# Patient Record
Sex: MALE | Race: WHITE | Employment: OTHER | URBAN - METROPOLITAN AREA
[De-identification: names, ages, dates, MRNs, and addresses within clinical notes are randomized per-mention and may not be internally consistent; named-entity substitution may affect disease eponyms.]

---

## 2019-01-14 ENCOUNTER — HOSPITAL ENCOUNTER (EMERGENCY)
Age: 29
Discharge: HOME | End: 2019-01-14

## 2019-01-14 VITALS
DIASTOLIC BLOOD PRESSURE: 79 MMHG | HEIGHT: 74 IN | SYSTOLIC BLOOD PRESSURE: 133 MMHG | WEIGHT: 300 LBS | OXYGEN SATURATION: 98 % | HEART RATE: 63 BPM | BODY MASS INDEX: 38.5 KG/M2 | TEMPERATURE: 97.9 F | RESPIRATION RATE: 12 BRPM

## 2019-01-14 DIAGNOSIS — G56.01 CARPAL TUNNEL SYNDROME OF RIGHT WRIST: Primary | ICD-10-CM

## 2019-01-14 PROCEDURE — 6370000000 HC RX 637 (ALT 250 FOR IP): Performed by: NURSE PRACTITIONER

## 2019-01-14 PROCEDURE — 99283 EMERGENCY DEPT VISIT LOW MDM: CPT

## 2019-01-14 RX ORDER — IBUPROFEN 800 MG/1
800 TABLET ORAL EVERY 6 HOURS PRN
Qty: 20 TABLET | Refills: 0 | Status: SHIPPED | OUTPATIENT
Start: 2019-01-14 | End: 2020-03-06

## 2019-01-14 RX ORDER — IBUPROFEN 800 MG/1
800 TABLET ORAL ONCE
Status: COMPLETED | OUTPATIENT
Start: 2019-01-14 | End: 2019-01-14

## 2019-01-14 RX ORDER — IBUPROFEN 800 MG/1
800 TABLET ORAL EVERY 6 HOURS PRN
COMMUNITY
End: 2019-01-14 | Stop reason: ALTCHOICE

## 2019-01-14 RX ORDER — M-VIT,TX,IRON,MINS/CALC/FOLIC 27MG-0.4MG
1 TABLET ORAL DAILY
COMMUNITY
End: 2022-06-12 | Stop reason: ALTCHOICE

## 2019-01-14 RX ADMIN — IBUPROFEN 800 MG: 800 TABLET ORAL at 13:14

## 2019-01-14 ASSESSMENT — PAIN DESCRIPTION - DESCRIPTORS: DESCRIPTORS: NUMBNESS

## 2019-01-14 ASSESSMENT — PAIN DESCRIPTION - PAIN TYPE: TYPE: ACUTE PAIN

## 2019-01-14 ASSESSMENT — PAIN DESCRIPTION - LOCATION: LOCATION: HAND;WRIST

## 2019-01-14 ASSESSMENT — PAIN SCALES - GENERAL: PAINLEVEL_OUTOF10: 3

## 2019-01-14 ASSESSMENT — PAIN DESCRIPTION - ORIENTATION: ORIENTATION: RIGHT

## 2020-02-25 ENCOUNTER — OFFICE VISIT (OUTPATIENT)
Dept: FAMILY MEDICINE CLINIC | Age: 30
End: 2020-02-25
Payer: COMMERCIAL

## 2020-02-25 VITALS
RESPIRATION RATE: 18 BRPM | HEIGHT: 74 IN | OXYGEN SATURATION: 98 % | HEART RATE: 81 BPM | BODY MASS INDEX: 40.43 KG/M2 | WEIGHT: 315 LBS | TEMPERATURE: 98.8 F | DIASTOLIC BLOOD PRESSURE: 78 MMHG | SYSTOLIC BLOOD PRESSURE: 137 MMHG

## 2020-02-25 PROCEDURE — G8417 CALC BMI ABV UP PARAM F/U: HCPCS | Performed by: NURSE PRACTITIONER

## 2020-02-25 PROCEDURE — G8427 DOCREV CUR MEDS BY ELIG CLIN: HCPCS | Performed by: NURSE PRACTITIONER

## 2020-02-25 PROCEDURE — 99204 OFFICE O/P NEW MOD 45 MIN: CPT | Performed by: NURSE PRACTITIONER

## 2020-02-25 PROCEDURE — G8484 FLU IMMUNIZE NO ADMIN: HCPCS | Performed by: NURSE PRACTITIONER

## 2020-02-25 PROCEDURE — 1036F TOBACCO NON-USER: CPT | Performed by: NURSE PRACTITIONER

## 2020-02-25 RX ORDER — PREDNISONE 10 MG/1
10 TABLET ORAL 2 TIMES DAILY
Qty: 10 TABLET | Refills: 0 | Status: SHIPPED | OUTPATIENT
Start: 2020-02-25 | End: 2020-03-01

## 2020-02-25 ASSESSMENT — ENCOUNTER SYMPTOMS
COUGH: 0
DIARRHEA: 0
WHEEZING: 0
BLOOD IN STOOL: 0
VOMITING: 0
ABDOMINAL PAIN: 0
TROUBLE SWALLOWING: 0
SHORTNESS OF BREATH: 0
COLOR CHANGE: 0
CHEST TIGHTNESS: 0
VOICE CHANGE: 0
NAUSEA: 0
EYE PAIN: 0
ABDOMINAL DISTENTION: 0
PHOTOPHOBIA: 0
CONSTIPATION: 0

## 2020-02-25 ASSESSMENT — PATIENT HEALTH QUESTIONNAIRE - PHQ9
1. LITTLE INTEREST OR PLEASURE IN DOING THINGS: 0
2. FEELING DOWN, DEPRESSED OR HOPELESS: 0
SUM OF ALL RESPONSES TO PHQ QUESTIONS 1-9: 0
SUM OF ALL RESPONSES TO PHQ QUESTIONS 1-9: 0
SUM OF ALL RESPONSES TO PHQ9 QUESTIONS 1 & 2: 0

## 2020-02-25 NOTE — PROGRESS NOTES
Brianna Hare is a 30 y.o. male who presents today for   Chief Complaint   Patient presents with   [place] Established New Doctor    Numbness     tingling right hand/wrist    Knee Pain     right-chronic    Other     family h/o CAD         HPI       Pt presents new to [facility], pt has not had a PCP for many years. Pt denies being treated for any chronic medical conditions in the past. He is not currently on any prescribed medications. Pt is a full time     Numbness/Tingling Right Hand  Pt c/o intermittent numbness/tingling to right hand and wrist for several months, denies any recent/past injuries. Pt stated he does have pain that wakes him up at night. Pt describes a burning -like pain. Works does a lot of repetitive pulling/lifitng etc    Chronic Right Knee Pain  Pt c/o chronic achy-like pain to right knee for > 6 months. Pt also c/o intermittent edema, denies deformities or warmth to affected knee. Denies any recent/past injuries, pt is requesting referral to Ortho, he has not had imaging, will order Xray today, pt does a lot of repetitive kneeling, works full time-      Pt is agreeable to have Preventative Labs ordered today, he has a significant family h/o CAD, pt stated many males within the family have been dx with CAD and MI between age 36 and 48, discussed further evaluation by Cardiology-pt is agreeable, he is currently asymptomatic      [facility]:  Patient's past medical, surgical, social and/or family history reviewed, updated in chart, and are non-contributory (unless otherwise stated). Medications and allergies also reviewed and updated in chart. Review of Systems  Review of Systems   Constitutional: Negative for activity change, appetite change, chills, diaphoresis, fatigue, fever and unexpected weight change.    HENT: Negative for congestion, ear discharge, ear pain, hearing loss, mouth sores, nosebleeds, rhinorrhea, sinus pressure, sinus pain, sneezing, sore toxic-appearing or diaphoretic. HENT:      Head: Normocephalic and atraumatic. Right Ear: Tympanic membrane, ear canal and external ear normal.      Left Ear: Tympanic membrane, ear canal and external ear normal.      Nose: Nose normal. No congestion or rhinorrhea. Mouth/Throat:      Mouth: Mucous membranes are moist.      Pharynx: Oropharynx is clear. No oropharyngeal exudate or posterior oropharyngeal erythema. Eyes:      General:         Right eye: No discharge. Left eye: No discharge. Extraocular Movements: Extraocular movements intact. Conjunctiva/sclera: Conjunctivae normal.      Pupils: Pupils are equal, round, and reactive to light. Neck:      Musculoskeletal: Normal range of motion and neck supple. No neck rigidity or muscular tenderness. Thyroid: No thyromegaly. Cardiovascular:      Rate and Rhythm: Normal rate and regular rhythm. Pulses: Normal pulses. Heart sounds: Normal heart sounds. No murmur. Comments: No peripheral edema  Pulmonary:      Effort: Pulmonary effort is normal. No respiratory distress. Breath sounds: Normal breath sounds. No stridor. No wheezing, rhonchi or rales. Chest:      Chest wall: No tenderness. Abdominal:      General: Bowel sounds are normal. There is no distension. Palpations: Abdomen is soft. There is no mass. Tenderness: There is no abdominal tenderness. There is no rebound. Musculoskeletal:      Comments: Mild/moderate generalized pain to right knee, mild edema present, no deformities, + crepitus w/flexion/extension, normal popliteal pulse, ambulating w/o limp    Mild pain present to right wrist-medial/lateral aspects, decreased strength present w/hand grasp, normal pulse. Capillary refill brisk, + Phalens   Lymphadenopathy:      Cervical: No cervical adenopathy. Skin:     General: Skin is warm and dry. Coloration: Skin is not jaundiced or pale. Findings: No bruising, erythema or rash. Neurological:      Mental Status: He is alert and oriented to person, place, and time. Deep Tendon Reflexes: Reflexes are normal and symmetric. Psychiatric:         Mood and Affect: Mood normal.         Behavior: Behavior normal.         Thought Content: Thought content normal.         Judgment: Judgment normal.           Assessment / Plan:      Mayito Morton was seen today for established new doctor    Diagnoses and all orders for this visit:    Healthcare maintenance  -     CBC Auto Differential; Future  -     Comprehensive Metabolic Panel; Future  -     Lipid Panel; Future  -     TSH without Reflex; Future    Numbness and tingling in right hand  -     Nerve conduction test; Future  -     predniSONE (DELTASONE) 10 MG tablet; Take 1 tablet by mouth 2 times daily for 5 days    Chronic pain of right knee  -     XR KNEE RIGHT (3 VIEWS); Future  -     Yuli Bonner 16, DO, Orthopaedics and Sports Medicine, [facility]    Family history of coronary artery disease younger than 36years of age  -     CBC Auto Differential; Future  -     Comprehensive Metabolic Panel; Future  -     Lipid Panel; Future  -     TSH without Reflex; Future  -     Janet Durán MD, Cardiology, [facility]         Call or go to ED immediately if symptoms worsen or persist.    Return for f/u will be based on labs. , or sooner if necessary. Educational materials and/or home exercises printed for patient's review and were included in patient instructions on his/her After Visit Summary and given to patient at the end of visit. Counseled regarding above diagnosis, including possible risks and complications,  especially if left uncontrolled. Counseled regarding the possible side effects, risks, benefits and alternatives to treatment; patient and/or guardian verbalizes understanding, agrees, feels comfortable with and wishes to proceed with above treatment plan.     Advised patient to call with any new medication issues, and

## 2020-02-27 ENCOUNTER — TELEPHONE (OUTPATIENT)
Dept: FAMILY MEDICINE CLINIC | Age: 30
End: 2020-02-27

## 2020-02-29 ENCOUNTER — HOSPITAL ENCOUNTER (OUTPATIENT)
Dept: GENERAL RADIOLOGY | Age: 30
Discharge: HOME | End: 2020-03-02
Payer: COMMERCIAL

## 2020-02-29 ENCOUNTER — HOSPITAL ENCOUNTER (OUTPATIENT)
Age: 30
Discharge: HOME | End: 2020-03-02
Payer: COMMERCIAL

## 2020-02-29 ENCOUNTER — HOSPITAL ENCOUNTER (OUTPATIENT)
Age: 30
Discharge: HOME | End: 2020-02-29
Payer: COMMERCIAL

## 2020-02-29 LAB
ALBUMIN SERPL-MCNC: 4.8 G/DL (ref 3.5–5.2)
ALP BLD-CCNC: 77 U/L (ref 40–129)
ALT SERPL-CCNC: 51 U/L (ref 0–40)
ANION GAP SERPL CALCULATED.3IONS-SCNC: 11 MMOL/L (ref 7–16)
AST SERPL-CCNC: 31 U/L (ref 0–39)
BASOPHILS ABSOLUTE: 0.02 E9/L (ref 0–0.2)
BASOPHILS RELATIVE PERCENT: 0.3 % (ref 0–2)
BILIRUB SERPL-MCNC: 0.7 MG/DL (ref 0–1.2)
BUN BLDV-MCNC: 18 MG/DL (ref 6–20)
CALCIUM SERPL-MCNC: 9.9 MG/DL (ref 8.6–10.2)
CHLORIDE BLD-SCNC: 101 MMOL/L (ref 98–107)
CHOLESTEROL, TOTAL: 206 MG/DL (ref 0–199)
CO2: 26 MMOL/L (ref 22–29)
CREAT SERPL-MCNC: 1 MG/DL (ref 0.7–1.2)
EOSINOPHILS ABSOLUTE: 0.1 E9/L (ref 0.05–0.5)
EOSINOPHILS RELATIVE PERCENT: 1.4 % (ref 0–6)
GFR AFRICAN AMERICAN: >60
GFR NON-AFRICAN AMERICAN: >60 ML/MIN/1.73
GLUCOSE BLD-MCNC: 102 MG/DL (ref 74–99)
HCT VFR BLD CALC: 48.6 % (ref 37–54)
HDLC SERPL-MCNC: 34 MG/DL
HEMOGLOBIN: 16.3 G/DL (ref 12.5–16.5)
IMMATURE GRANULOCYTES #: 0.03 E9/L
IMMATURE GRANULOCYTES %: 0.4 % (ref 0–5)
LDL CHOLESTEROL CALCULATED: 144 MG/DL (ref 0–99)
LYMPHOCYTES ABSOLUTE: 1.88 E9/L (ref 1.5–4)
LYMPHOCYTES RELATIVE PERCENT: 27.2 % (ref 20–42)
MCH RBC QN AUTO: 29.6 PG (ref 26–35)
MCHC RBC AUTO-ENTMCNC: 33.5 % (ref 32–34.5)
MCV RBC AUTO: 88.4 FL (ref 80–99.9)
MONOCYTES ABSOLUTE: 0.5 E9/L (ref 0.1–0.95)
MONOCYTES RELATIVE PERCENT: 7.2 % (ref 2–12)
NEUTROPHILS ABSOLUTE: 4.39 E9/L (ref 1.8–7.3)
NEUTROPHILS RELATIVE PERCENT: 63.5 % (ref 43–80)
PDW BLD-RTO: 12.1 FL (ref 11.5–15)
PLATELET # BLD: 187 E9/L (ref 130–450)
PMV BLD AUTO: 10.5 FL (ref 7–12)
POTASSIUM SERPL-SCNC: 4.4 MMOL/L (ref 3.5–5)
RBC # BLD: 5.5 E12/L (ref 3.8–5.8)
SODIUM BLD-SCNC: 138 MMOL/L (ref 132–146)
TOTAL PROTEIN: 7.4 G/DL (ref 6.4–8.3)
TRIGL SERPL-MCNC: 142 MG/DL (ref 0–149)
TSH SERPL DL<=0.05 MIU/L-ACNC: 0.27 UIU/ML (ref 0.27–4.2)
VLDLC SERPL CALC-MCNC: 28 MG/DL
WBC # BLD: 6.9 E9/L (ref 4.5–11.5)

## 2020-02-29 PROCEDURE — 80053 COMPREHEN METABOLIC PANEL: CPT

## 2020-02-29 PROCEDURE — 36415 COLL VENOUS BLD VENIPUNCTURE: CPT

## 2020-02-29 PROCEDURE — 73562 X-RAY EXAM OF KNEE 3: CPT

## 2020-02-29 PROCEDURE — 80061 LIPID PANEL: CPT

## 2020-02-29 PROCEDURE — 84443 ASSAY THYROID STIM HORMONE: CPT

## 2020-02-29 PROCEDURE — 85025 COMPLETE CBC W/AUTO DIFF WBC: CPT

## 2020-03-01 ASSESSMENT — ENCOUNTER SYMPTOMS
SINUS PRESSURE: 0
RECTAL PAIN: 0
CHOKING: 0
EYE DISCHARGE: 0
SORE THROAT: 0
ANAL BLEEDING: 0
STRIDOR: 0
RHINORRHEA: 0
EYE REDNESS: 0
EYE ITCHING: 0
SINUS PAIN: 0
APNEA: 0

## 2020-03-02 ENCOUNTER — HOSPITAL ENCOUNTER (OUTPATIENT)
Age: 30
Discharge: HOME | End: 2020-03-04
Payer: COMMERCIAL

## 2020-03-02 ENCOUNTER — HOSPITAL ENCOUNTER (OUTPATIENT)
Dept: GENERAL RADIOLOGY | Age: 30
Discharge: HOME | End: 2020-03-04
Payer: COMMERCIAL

## 2020-03-02 PROCEDURE — 73560 X-RAY EXAM OF KNEE 1 OR 2: CPT

## 2020-03-06 ENCOUNTER — OFFICE VISIT (OUTPATIENT)
Dept: ORTHOPEDIC SURGERY | Age: 30
End: 2020-03-06
Payer: COMMERCIAL

## 2020-03-06 ENCOUNTER — TELEPHONE (OUTPATIENT)
Dept: FAMILY MEDICINE CLINIC | Age: 30
End: 2020-03-06

## 2020-03-06 VITALS — BODY MASS INDEX: 39.17 KG/M2 | WEIGHT: 315 LBS | HEIGHT: 75 IN

## 2020-03-06 PROCEDURE — G8427 DOCREV CUR MEDS BY ELIG CLIN: HCPCS | Performed by: ORTHOPAEDIC SURGERY

## 2020-03-06 PROCEDURE — 99203 OFFICE O/P NEW LOW 30 MIN: CPT | Performed by: ORTHOPAEDIC SURGERY

## 2020-03-06 PROCEDURE — 1036F TOBACCO NON-USER: CPT | Performed by: ORTHOPAEDIC SURGERY

## 2020-03-06 PROCEDURE — G8484 FLU IMMUNIZE NO ADMIN: HCPCS | Performed by: ORTHOPAEDIC SURGERY

## 2020-03-06 PROCEDURE — G8417 CALC BMI ABV UP PARAM F/U: HCPCS | Performed by: ORTHOPAEDIC SURGERY

## 2020-03-06 RX ORDER — PREDNISONE 1 MG/1
5 TABLET ORAL DAILY
COMMUNITY
End: 2020-06-03

## 2020-03-06 NOTE — PROGRESS NOTES
meetings of clubs or organizations: Not on file     Relationship status: Not on file    Intimate partner violence:     Fear of current or ex partner: Not on file     Emotionally abused: Not on file     Physically abused: Not on file     Forced sexual activity: Not on file   Other Topics Concern    Not on file   Social History Narrative    Not on file     No family history on file. ROS:    Skin: (-) rash,(-) psoriasis,(-) eczema, (-)skin cancer. Musculoskeletal: (-) fractures,  (-) dislocations,(-) collagen vascular disease, (-) fibromyalgia, (-) multiple sclerosis, (-) muscular dystrophy, (-) RSD,(-) joint pain (-)swelling, (-) joint pain,swelling. Neurologic: (-) epilepsy, (-)seizures,(-) brain tumor,(-) TIA, (-)stroke, (-)headaches, (-)Parkinson disease,(-) memory loss, (-) LOC. Cardiovascular: (-) Chest pain, (-) swelling in legs/feet, (-) SOB, (-) cramping in legs/feet with walking. Physical Exam:    Ht 6' 3\" (1.905 m)   Wt (!) 330 lb (149.7 kg)   BMI 41.25 kg/m²     GENERAL: alert, appears stated age, cooperative, no distress    HEENT: Head is normocephalic, atraumatic. PERRLA. SKIN: Clean, dry, intact. There is not any cellulitis or cutaneous lesions noted in the lower extremities except noted below in MSK    PULMONARY: breathing is regular and unlabored, no acute distress    CV: The bilateral lower extremities are warm and well-perfused with brisk capillary refill. 2+ pulses LE bilateral.     PSYCHIATRY: Pleasant mood, appropriate behavior, follows commands    NEURO: Sensation is intact distally with light touch with no alteration. Motor exam of the lower extremities show quadriceps, hamstrings, foot dorsiflexion and plantarflexion grossly intact 5/5. LYMPH: No lymphedema present distally in LE.      Ortho Exam       Right Knee  Alignment:  neutral   ROM:  0 degrees extension to 110 degrees flexion      Crepitus:  no   Joint Tenderness:  medial joint line   Effusion:   moderate Patellar tilt test:  negative   Patellar facet tenderness:  negative medial   negative lateral   Trochlear tenderness:  negative   Moving patellar apprehension test:  negative   Hoffa's test:  negative medial   negative lateral      Hyperextension test:  negative medial   negative lateral   Lachman test:  negative      Anterior drawer test:  negative   Pivot shift test:  negative   Posterior drawer:   negative   Varus laxity at 30 degrees:  negative      Valgus laxity at 30 degrees:   negative      Jamey's test:  positive       Quadriceps atrophy:  None     Left Knee  Alignment:  neutral   ROM:  0 degrees extension to 140 degrees flexion      Crepitus:  no   Joint Tenderness:  none    Effusion:   none        Patellar tilt test:  negative   Patellar facet tenderness:  negative medial   negative lateral   Trochlear tenderness:  negative   Moving patellar apprehension test:  negative   Hoffa's test:  negative medial   negative lateral      Hyperextension test:  negative medial   negative lateral   Lachman test:  negative      Anterior drawer test:  negative   Pivot shift test:  negative   Posterior drawer:   negative   Varus laxity at 30 degrees:  negative      Valgus laxity at 30 degrees:   negative      Jamey's test:  negative       Quadriceps atrophy:  None         Imaging:  Xr Knee Right (1-2 Views)    Result Date: 3/2/2020  Patient MRN:  [id number] : 1990 Age: 30 years Gender: Male Order Date:  3/2/2020 1:20 PM EXAM: XR KNEE RIGHT (1-2 VIEWS) NUMBER OF IMAGES:  3 views INDICATION: R93.6 Abnormal x-ray of knee RADIOLOGIST RECOMMEND SUNRISE VIEW FOR PROBABLE RIGHT PATELLA FRACTURE abnormal, probable patella fracture- recommend SUNRISE VIEW COMPARISON: 2020 . The bones appear to be in anatomic alignment. No acute fracture is identified. No foreign body is identified. No other osseous abnormality is seen. No acute osseous abnormality is identified.  This study was dictated by Soniya Reed Francisco Holguin and Kleber Vivas MD reviewed and concurred with the findings. Xr Knee Right (3 Views)    Result Date: 2020  Patient MRN:  [id number] : 1990 Age: 30 years Gender: Male Order Date:  2020 10:09 AM EXAM: XR KNEE RIGHT (3 VIEWS) NUMBER OF IMAGES:  4 INDICATION: M25.561 Chronic pain of right knee knee pain COMPARISON: None RESULT: There is probably a small fracture at the lateral patellar margin versus normal variant. Otherwise, no fracture of the distal femur or proximal tibia or fibula. Joint spaces are maintained. No knee effusion.  ---------------------------------------------     Probable small fracture at the lateral patellar margin versus normal variant. A sunrise view of the knee could help further explain this finding. Otherwise, no acute process. Mu Angulo was seen today for knee pain. Diagnoses and all orders for this visit:    Tear of medial meniscus of right knee, current, unspecified tear type, initial encounter  -     MRI Knee Right WO Contrast; Future        Patient seen and examined. X-rays reviewed. Patient sustained a noncontact twisting injury to the right knee. Patient's main complaint is instability of symptomatic knee. Exam and history is consistent with possible meniscus tear and other possible internal derangement such as ACL tear, patellar dislocation, loose body. MRI recommended for further evaluation management. Follow-up after MRI.          Helga Hopkins DO  3/6/20

## 2020-03-07 RX ORDER — ATORVASTATIN CALCIUM 20 MG/1
20 TABLET, FILM COATED ORAL DAILY
Qty: 90 TABLET | Refills: 1 | Status: SHIPPED
Start: 2020-03-07 | End: 2020-07-30

## 2020-03-11 ENCOUNTER — HOSPITAL ENCOUNTER (OUTPATIENT)
Dept: NEUROLOGY | Age: 30
Discharge: HOME | End: 2020-03-11
Payer: COMMERCIAL

## 2020-03-11 VITALS — WEIGHT: 315 LBS | HEIGHT: 75 IN | BODY MASS INDEX: 39.17 KG/M2

## 2020-03-11 PROCEDURE — 95910 NRV CNDJ TEST 7-8 STUDIES: CPT | Performed by: PSYCHIATRY & NEUROLOGY

## 2020-03-11 PROCEDURE — 95886 MUSC TEST DONE W/N TEST COMP: CPT

## 2020-03-11 PROCEDURE — 95886 MUSC TEST DONE W/N TEST COMP: CPT | Performed by: PSYCHIATRY & NEUROLOGY

## 2020-03-11 PROCEDURE — 95910 NRV CNDJ TEST 7-8 STUDIES: CPT

## 2020-03-11 NOTE — PROCEDURES
Flexor pollicis longus N None None None None N N N N   R. Flexor digitorum profundus, dig 2 & 3 N None None None None N N N N   R. Extensor indicis proprius N None None None None N N N N   R. Abductor pollicis brevis N None None None None N 1+ Few Sl Decr   R. First dorsal interosseous N None None None None N N N N       Nerve conduction studies in both arms disclosed the following abnormalities--- motor prolongation of the distal latency of the right median at the wrist, with minimal prolongation of the distal motor latency of the left medial to the wrist.  The motor amplitudes of the right median nerve were decreased. Sensory nerve potentials were not obtained from the right median nerve, with minimal delay in his distal sensory latency of the left median nerve. F-wave latencies of both median nerves were prolonged. These findings were compared to the referential values in this laboratory, available upon request.    Monopolar needle examination of the right arm revealed minimal chronic denervation changes and conduction block in the abductor pollicis brevis muscle. Needle testing of the paraspinals was unrevealing. Electrodiagnostic semination of both arms disclosed evidence diagnostic of the following--- bilateral median neuropathies at/or distal to the wrists---the right of moderate severity and the left of minimal severity. These findings were consistent with carpal tunnel syndrome. There were no other peripheral neuropathies. There were no motor radiculopathies or intracanalicular lesions. Sensory radiculopathies cannot be evaluated by electrodiagnostic means. Clinically, the patient presented with marked tingling sensations in both hands, more so the right one. He would awaken with these paresthesias in his right hand. On brief neurological examination, he presented with a marked Tinel sign at the right wrist.  I found no motor or sensory compromise.   His difficulties are related to his right carpal tunnel syndrome. Right carpal tunnel release was recommended. Clinical correlation was highly advised.

## 2020-05-29 ENCOUNTER — TELEPHONE (OUTPATIENT)
Dept: ORTHOPEDIC SURGERY | Age: 30
End: 2020-05-29

## 2020-06-02 ENCOUNTER — OFFICE VISIT (OUTPATIENT)
Dept: ORTHOPEDIC SURGERY | Age: 30
End: 2020-06-02
Payer: COMMERCIAL

## 2020-06-02 VITALS — WEIGHT: 300 LBS | BODY MASS INDEX: 38.5 KG/M2 | TEMPERATURE: 98.2 F | HEIGHT: 74 IN | RESPIRATION RATE: 22 BRPM

## 2020-06-02 PROCEDURE — 99214 OFFICE O/P EST MOD 30 MIN: CPT | Performed by: ORTHOPAEDIC SURGERY

## 2020-06-02 PROCEDURE — G8427 DOCREV CUR MEDS BY ELIG CLIN: HCPCS | Performed by: ORTHOPAEDIC SURGERY

## 2020-06-02 PROCEDURE — 1036F TOBACCO NON-USER: CPT | Performed by: ORTHOPAEDIC SURGERY

## 2020-06-02 PROCEDURE — G8417 CALC BMI ABV UP PARAM F/U: HCPCS | Performed by: ORTHOPAEDIC SURGERY

## 2020-06-02 NOTE — PROGRESS NOTES
Department of Orthopedic Surgery  History and Physical      CHIEF COMPLAINT:  Right hand numbness and tingling    HISTORY OF PRESENT ILLNESS:                The patient is a LHD 30 y.o. male who presents with right hand numbness and tingling. Patient has had right hand numbness and tingling for the last 9 months. He states this is progressively worsened and has become constant every day and every night symptoms of numbness, tingling and pain. He reports diminished sensation to his right thumb, index and middle finger. He states he wakes up at night with numbness and tingling and pain. He has tried a brace at night which does not help his symptoms. He works as a . He states his left hand is not bothersome to him. Patient did have an EMG nerve conduction study test dated March 11 of 2020. Right median nerve motor latency was 6.25 and left was 4.58. Right median nerve sensory latency was non-recordable and left was 4.32. On the EMG portion of the exam he did have 1+ dur and fwe PPP and decrease in recruitment pattern to the right APB. This is consistent with right moderate carpal tunnel syndrome and left mild carpal tunnel syndrome. Past Medical History:        Diagnosis Date    Hypertension      Past Surgical History:        Procedure Laterality Date    FRACTURE SURGERY      right wrist, left knee, right leg    KNEE SURGERY       Current Medications:   No current facility-administered medications for this visit. Allergies:  Patient has no known allergies. Social History:   TOBACCO:   reports that he has never smoked. He has never used smokeless tobacco.  ETOH:   reports no history of alcohol use. DRUGS:   reports current drug use. Drug: Marijuana. ACTIVITIES OF DAILY LIVING:    OCCUPATION:    Family History:   No family history on file.     REVIEW OF SYSTEMS:  CONSTITUTIONAL:  negative  EYES:  negative  HEENT:  negative  RESPIRATORY:  negative  CARDIOVASCULAR:  htn  GASTROINTESTINAL: 02/29/2020    MCV 88.4 02/29/2020    MCH 29.6 02/29/2020    MCHC 33.5 02/29/2020    RDW 12.1 02/29/2020     02/29/2020    MPV 10.5 02/29/2020     PT/INR:  No results found for: PROTIME, INR    Radiology Review: X-rays of the right wrist were obtained today in the office and reviewed with patient. 4 views: AP, lateral, oblique, carpal tunnel view demonstrate no acute fractures or dislocations. Impression: No acute fractures or dislocations    IMPRESSION:  · Right carpal tunnel syndrome    PLAN:  Discussed findings with patient. Discussed conservative and surgical management with the patient. Patient would like to proceed with surgical management. We will plan for a right endoscopic carpal tunnel release. Postoperative course explained to the patient. Patient like to proceed. All questions answered. I explained the risks, benefits, alternatives and complications of surgery with the patient including but not limited to the risks of infection, possible damage to nerves, vessels, or tendons, stiffness, loss of range of motion, scar sensitivity, wound healing complications, worsening symptoms, possible need for therapy, as well as the possible need further surgery and unanticipated complications. The patient voiced understanding and all questions were answered. The patient elected to proceed with surgical intervention. I have seen and evaluated the patient and agree with the above assessment and plan on today's visit. I have performed the key components of the history and physical examination with significant findings of severe right carpal tunnel syndrome with significant numbness and constant symptoms. . I concur with the findings and plan as documented.     Jorge Goodwin MD  6/2/2020

## 2020-06-02 NOTE — PATIENT INSTRUCTIONS
just the thumb and index finger can put stress on the wrist.  · Do not smoke. It can make this condition worse by reducing blood flow to the median nerve. If you need help quitting, talk to your doctor about stop-smoking programs and medicines. These can increase your chances of quitting for good. When should you call for help? Watch closely for changes in your health, and be sure to contact your doctor if:  · Your pain or other problems do not get better with home care. · You want more information about physical or occupational therapy. · You have side effects of your corticosteroid medicine, such as:  ? Weight gain. ? Mood changes. ? Trouble sleeping. ? Bruising easily. · You have any other problems with your medicine. Where can you learn more? Go to [email or web address] and sign in to your [employer] account. Enter R432 in the [employer] box to learn more about \"Carpal Tunnel Syndrome: Care Instructions. \"     If you do not have an account, please click on the \"Sign Up Now\" link. Current as of: March 2, 2020               Content Version: 12.5  © 9678-9488 [employer]. Care instructions adapted under license by [facility] ([facility]). If you have questions about a medical condition or this instruction, always ask your healthcare professional. [address] any warranty or liability for your use of this information. Patient Education        Carpal Tunnel Release: Before Your Surgery  What is carpal tunnel release? Carpal tunnel surgery reduces the pressure on a nerve in the wrist. Your doctor will cut a ligament that presses on the nerve. This lets the nerve pass freely through the tunnel without being squeezed. This is also called carpal tunnel release surgery. The surgery can be open or endoscopic. In open surgery, your doctor makes a small cut in the palm of your hand. This cut is called an incision.  In endoscopic surgery, your doctor always ask your healthcare professional. [place] [address] any warranty or liability for your use of this information. Patient Education        Carpal Tunnel Syndrome: Exercises  Introduction  Here are some examples of exercises for you to try. The exercises may be suggested for a condition or for rehabilitation. Start each exercise slowly. Ease off the exercises if you start to have pain. You will be told when to start these exercises and which ones will work best for you. Warm-up stretches  When you no longer have pain or numbness, you can do exercises to help prevent carpal tunnel syndrome from coming back. Do not do any stretch or movement that is uncomfortable or painful. 1. Rotate your wrist up, down, and from side to side. Repeat 4 times. 2. Stretch your fingers far apart. Relax them, and then stretch them again. Repeat 4 times. 3. Stretch your thumb by pulling it back gently, holding it, and then releasing it. Repeat 4 times. How to do the exercises  Prayer stretch   1. Start with your palms together in front of your chest just below your chin. 2. Slowly lower your hands toward your waistline, keeping your hands close to your stomach and your palms together until you feel a mild to moderate stretch under your forearms. 3. Hold for at least 15 to 30 seconds. Repeat 2 to 4 times. Wrist flexor stretch   1. Extend your arm in front of you with your palm up. 2. Bend your wrist, pointing your hand toward the floor. 3. With your other hand, gently bend your wrist farther until you feel a mild to moderate stretch in your forearm. 4. Hold for at least 15 to 30 seconds. Repeat 2 to 4 times. Wrist extensor stretch   1. Repeat steps 1 through 4 of the stretch above, but begin with your extended hand palm down. Follow-up care is a key part of your treatment and safety. Be sure to make and go to all appointments, and call your doctor if you are having problems.  It's also a good

## 2020-06-03 ENCOUNTER — OFFICE VISIT (OUTPATIENT)
Dept: ORTHOPEDIC SURGERY | Age: 30
End: 2020-06-03
Payer: COMMERCIAL

## 2020-06-03 VITALS — WEIGHT: 300 LBS | BODY MASS INDEX: 38.5 KG/M2 | HEIGHT: 74 IN

## 2020-06-03 PROCEDURE — 1036F TOBACCO NON-USER: CPT | Performed by: ORTHOPAEDIC SURGERY

## 2020-06-03 PROCEDURE — 99213 OFFICE O/P EST LOW 20 MIN: CPT | Performed by: ORTHOPAEDIC SURGERY

## 2020-06-03 PROCEDURE — G8417 CALC BMI ABV UP PARAM F/U: HCPCS | Performed by: ORTHOPAEDIC SURGERY

## 2020-06-03 PROCEDURE — G8428 CUR MEDS NOT DOCUMENT: HCPCS | Performed by: ORTHOPAEDIC SURGERY

## 2020-06-03 NOTE — PROGRESS NOTES
Joint Tenderness:  medial joint line   Effusion:   moderate        Patellar tilt test:  negative   Patellar facet tenderness:  negative medial   negative lateral   Trochlear tenderness:  negative   Moving patellar apprehension test:  negative   Hoffa's test:  negative medial   negative lateral      Hyperextension test:  negative medial   negative lateral   Lachman test:  negative      Anterior drawer test:  negative   Pivot shift test:  negative   Posterior drawer:   negative   Varus laxity at 30 degrees:  negative      Valgus laxity at 30 degrees:   negative      Jamey's test:  positive       Quadriceps atrophy:  None     Left Knee  Alignment:  neutral   ROM:  0 degrees extension to 140 degrees flexion      Crepitus:  no   Joint Tenderness:  none    Effusion:   none        Patellar tilt test:  negative   Patellar facet tenderness:  negative medial   negative lateral   Trochlear tenderness:  negative   Moving patellar apprehension test:  negative   Hoffa's test:  negative medial   negative lateral      Hyperextension test:  negative medial   negative lateral   Lachman test:  negative      Anterior drawer test:  negative   Pivot shift test:  negative   Posterior drawer:   negative   Varus laxity at 30 degrees:  negative      Valgus laxity at 30 degrees:   negative      Jamey's test:  negative       Quadriceps atrophy:  None         Imaging:  Xr Knee Right (1-2 Views)    Result Date: 3/2/2020  Patient MRN:  [id number] : 1990 Age: 30 years Gender: Male Order Date:  3/2/2020 1:20 PM EXAM: XR KNEE RIGHT (1-2 VIEWS) NUMBER OF IMAGES:  3 views INDICATION: R93.6 Abnormal x-ray of knee RADIOLOGIST RECOMMEND SUNRISE VIEW FOR PROBABLE RIGHT PATELLA FRACTURE abnormal, probable patella fracture- recommend SUNRISE VIEW COMPARISON: 2020 . The bones appear to be in anatomic alignment. No acute fracture is identified. No foreign body is identified. No other osseous abnormality is seen.      No acute osseous abnormality is identified. This study was dictated by Zenon Hagen PA-C and Temi Hewitt MD reviewed and concurred with the findings. Xr Knee Right (3 Views)    Result Date: 2020  Patient MRN:  [id number] : 1990 Age: 30 years Gender: Male Order Date:  2020 10:09 AM EXAM: XR KNEE RIGHT (3 VIEWS) NUMBER OF IMAGES:  4 INDICATION: M25.561 Chronic pain of right knee knee pain COMPARISON: None RESULT: There is probably a small fracture at the lateral patellar margin versus normal variant. Otherwise, no fracture of the distal femur or proximal tibia or fibula. Joint spaces are maintained. No knee effusion.  ---------------------------------------------     Probable small fracture at the lateral patellar margin versus normal variant. A sunrise view of the knee could help further explain this finding. Otherwise, no acute process. MRI right knee 3/20/2020     Indication: Knee pain, evaluate for medial meniscus tear.       Comparison: Right knee radiograph from 3/2/2020.       Technique: Multiplanar, multisequence MR imaging of the right knee was   performed without administration of intravenous contrast.       FINDINGS:       The anterior cruciate ligament and posterior cruciate ligament are   intact. The medial collateral ligament is intact. The iliotibial band,   fibular collateral ligament, and biceps femoris tendon are intact. The   popliteus muscle and tendon are intact.       The medial meniscus and posterior root attachment are intact. There is   slight cartilage heterogeneity along the medial aspect of the medial   tibial plateau.       The lateral meniscus and posterior root attachment are intact. The   lateral compartment cartilage is intact.       There is cartilage surface irregularity along the patellar apex. There   are focal areas of cartilage loss with underlying subchondral reactive   change along the medial trochlea.       The quadriceps and patellar tendons are intact.  There is patella felisha. There is slight lateral patellar tilt. The tibial tuberosity to   trochlear groove distance is approximately 24 mm. The pes anserinus   and semimembranosus tendons are intact.       There is a small joint effusion. There is no Baker's cyst. The fat   pads are preserved in signal.       There is no evidence of fracture or osteonecrosis.         Impression   1. Patella felisha with slight lateral patellar tilt and increased tibial   tuberosity to trochlear groove distance, correlate for patellar   instability. 2. Cartilage surface irregularity along the patellar apex with focal   areas of grade 4 chondrosis along the medial trochlea. 3. Menisci are intact. Lizzie Rowe was seen today for knee pain. Diagnoses and all orders for this visit:    Disorder of patellofemoral joint, unspecified laterality    Other orders  -     Amb External Referral To Physical Therapy        Patient seen and examined. X-rays reviewed. Patient sustained a noncontact twisting injury to the right knee. Patient's main complaint is instability of symptomatic knee. Exam and history is consistent with possible meniscus tear and other possible internal derangement such as ACL tear, patellar dislocation, loose body. MRI recommended for further evaluation management. Follow-up after MRI. Patient seen and examined. X-rays reviewed. Natural history and course discussed with patient. Treatment options discussed with patient in detail including risks and benefits. Patient should do well with conservative management at this time. In a 15 minute assessment and discussion, patient was counseled on continued weight loss, diet, and physical activity relating to this condition.  He was educated with options in detail including nutrition, joining a health club/ weight loss program, and use of cardio equipment such as the Arc Trainer and the importance of use as well as range of motion and HEP exercises for weight [place].       Craig Cano,             25 minutes was spent with patient. 50% or greater was spent counseling the patient.

## 2020-07-09 ENCOUNTER — PREP FOR PROCEDURE (OUTPATIENT)
Dept: ORTHOPEDIC SURGERY | Age: 30
End: 2020-07-09

## 2020-07-09 RX ORDER — SODIUM CHLORIDE 0.9 % (FLUSH) 0.9 %
10 SYRINGE (ML) INJECTION PRN
Status: CANCELLED | OUTPATIENT
Start: 2020-07-09

## 2020-07-09 RX ORDER — SODIUM CHLORIDE 9 MG/ML
INJECTION, SOLUTION INTRAVENOUS CONTINUOUS
Status: CANCELLED | OUTPATIENT
Start: 2020-07-09

## 2020-07-09 RX ORDER — SODIUM CHLORIDE 0.9 % (FLUSH) 0.9 %
10 SYRINGE (ML) INJECTION EVERY 12 HOURS SCHEDULED
Status: CANCELLED | OUTPATIENT
Start: 2020-07-09

## 2020-07-30 ENCOUNTER — OFFICE VISIT (OUTPATIENT)
Dept: CARDIOLOGY CLINIC | Age: 30
End: 2020-07-30
Payer: COMMERCIAL

## 2020-07-30 VITALS
WEIGHT: 315 LBS | SYSTOLIC BLOOD PRESSURE: 122 MMHG | DIASTOLIC BLOOD PRESSURE: 60 MMHG | HEART RATE: 61 BPM | BODY MASS INDEX: 39.17 KG/M2 | HEIGHT: 75 IN

## 2020-07-30 PROBLEM — E78.00 PURE HYPERCHOLESTEROLEMIA: Status: ACTIVE | Noted: 2020-07-30

## 2020-07-30 PROBLEM — E66.01 MORBID OBESITY (HCC): Status: ACTIVE | Noted: 2020-07-30

## 2020-07-30 PROCEDURE — 99243 OFF/OP CNSLTJ NEW/EST LOW 30: CPT | Performed by: INTERNAL MEDICINE

## 2020-07-30 PROCEDURE — G8417 CALC BMI ABV UP PARAM F/U: HCPCS | Performed by: INTERNAL MEDICINE

## 2020-07-30 PROCEDURE — 93000 ELECTROCARDIOGRAM COMPLETE: CPT | Performed by: INTERNAL MEDICINE

## 2020-07-30 PROCEDURE — G8427 DOCREV CUR MEDS BY ELIG CLIN: HCPCS | Performed by: INTERNAL MEDICINE

## 2020-07-30 NOTE — PROGRESS NOTES
Social History:  Social History     Socioeconomic History    Marital status:      Spouse name: Not on file    Number of children: Not on file    Years of education: Not on file    Highest education level: Not on file   Occupational History    Not on file   Social Needs    Financial resource strain: Not on file    Food insecurity     Worry: Not on file     Inability: Not on file    Transportation needs     Medical: Not on file     Non-medical: Not on file   Tobacco Use    Smoking status: Never Smoker    Smokeless tobacco: Never Used   Substance and Sexual Activity    Alcohol use: No     Comment: occasional. 1-2 coffee per day    Drug use: Yes     Types: Marijuana     Comment: recreational    Sexual activity: Not on file   Lifestyle    Physical activity     Days per week: Not on file     Minutes per session: Not on file    Stress: Not on file   Relationships    Social connections     Talks on phone: Not on file     Gets together: Not on file     Attends [employer] service: Not on file     Active member of club or organization: Not on file     Attends meetings of clubs or organizations: Not on file     Relationship status: Not on file    Intimate partner violence     Fear of current or ex partner: Not on file     Emotionally abused: Not on file     Physically abused: Not on file     Forced sexual activity: Not on file   Other Topics Concern    Not on file   Social History Narrative    Not on file       Allergies:  No Known Allergies    Current Medications:  Current Outpatient Medications   Medication Sig Dispense Refill    Multiple Vitamins-Minerals (THERAPEUTIC MULTIVITAMIN-MINERALS) tablet Take 1 tablet by mouth daily       No current facility-administered medications for this visit.           Physical Exam:  /60   Pulse 61   Ht 6' 3\" (1.905 m)   Wt (!) 330 lb (149.7 kg)   BMI 41.25 kg/m²   Wt Readings from Last 3 Encounters:   07/30/20 (!) 330 lb (149.7 kg)   06/03/20 300 lb (136.1 kg)   06/02/20 300 lb (136.1 kg)     The patient is awake, alert and in no discomfort or distress. No gross musculoskeletal deformity or lymphadenopathy are present. No significant skin or nail changes are present. Gross examination of head, eyes, nose and throat are negative. Jugular venous pressure is normal and no carotid bruits are present. No thyromegaly is noted. Normal respiratory effort is noted with no accessory muscle usage present. Lung fields are clear to ascultation. Cardiac examination is notable for a regular rate and rhythm with no palpable thrill. No gallop rhythm or cardiac murmur are identified. A benign abdominal examination is present with the exception of obesity and no masses or organomegaly. A normal abdominal aortic pulsation is present. Intact pulses are present throughout all extremities and no peripheral edema is present. No focal neurologic deficits are present. Laboratory Tests:  Lab Results   Component Value Date    CREATININE 1.0 02/29/2020    BUN 18 02/29/2020     02/29/2020    K 4.4 02/29/2020     02/29/2020    CO2 26 02/29/2020     No results found for: BNP  Lab Results   Component Value Date    WBC 6.9 02/29/2020    RBC 5.50 02/29/2020    HGB 16.3 02/29/2020    HCT 48.6 02/29/2020    MCV 88.4 02/29/2020    MCH 29.6 02/29/2020    MCHC 33.5 02/29/2020    RDW 12.1 02/29/2020     02/29/2020    MPV 10.5 02/29/2020     No results for input(s): ALKPHOS, ALT, AST, PROT, BILITOT, BILIDIR, LABALBU in the last 72 hours.   No results found for: MG  No results found for: PROTIME, INR  Lab Results   Component Value Date    TSH 0.272 02/29/2020     No components found for: CHLPL  Lab Results   Component Value Date    TRIG 142 02/29/2020     Lab Results   Component Value Date    HDL 34 02/29/2020     Lab Results   Component Value Date    LDLCALC 144 (H) 02/29/2020       Cardiac Tests:  ECG: A resting electrocardiogram demonstrates evidence of sinus rhythm and is otherwise unremarkable      ASSESSMENT / PLAN: On a clinical basis, the patient appears stable from a cardiovascular standpoint and presents with no active symptoms of a cardiovascular origin in the face of a family history of premature coronary atherosclerosis with the index event in additional family members occurring in excess of a decade beyond that of his present age. I have extensively discussed this with him and presently have not recommended additional cardiovascular testing and will defer to your discretion if felt appropriate from your standpoint only the consideration of a coronary calcium assessment. I otherwise have extensively discussed his needs of appropriate lifestyle modification to achieve weight reduction to benefit diastolic cardiac performance and to reduce his risk of the development of obstructive sleep apnea with features potentially indicative of its presence warranting a formal sleep assessment and as appropriate the initiation of nocturnal CPAP to reduce risk of adverse cardiovascular effects which I will additionally defer to your discretion. He will continue to benefit from aggressive risk factor monitoring both of his blood pressure and serum lipids and attempt to reduce risk of future atherosclerotic development. I will presently return him to your care with happy to reevaluate him in the future should additional cardiovascular difficulties or concerns arise. Follow-up office visit as needed should additional cardiovascular difficulties or concerns arise. Thank you for allowing me to participate in your patient's care. Please feel free to contact me if you have any questions or concerns. Note: This report was completed utilizing a computerized voice recognition software. Every effort has been made to insure accuracy, however; inadvertent computerized transcription errors may be present. Ambika Astudillo.  Anh Rojas, [address] [place] [address] Cardiology    An electronic copy of this consult note was forwarded to [facility], NP

## 2020-11-20 ENCOUNTER — TELEPHONE (OUTPATIENT)
Dept: FAMILY MEDICINE CLINIC | Age: 30
End: 2020-11-20

## 2020-11-22 NOTE — TELEPHONE ENCOUNTER
Pt was referred to Dr. Harish Salmeron and was seen in June for right knee pain, does he want a new referral to a different Ortho??

## 2020-11-24 NOTE — TELEPHONE ENCOUNTER
Patient called stated Dr Laura Arteaga wanted him to do PT, patient already has done PT , so patient and wife wants a second opinion from another ortho pcp

## 2020-12-27 ENCOUNTER — HOSPITAL ENCOUNTER (EMERGENCY)
Age: 30
Discharge: HOME | End: 2020-12-27
Attending: EMERGENCY MEDICINE
Payer: COMMERCIAL

## 2020-12-27 VITALS
TEMPERATURE: 97.2 F | OXYGEN SATURATION: 98 % | HEART RATE: 65 BPM | DIASTOLIC BLOOD PRESSURE: 94 MMHG | HEIGHT: 75 IN | RESPIRATION RATE: 16 BRPM | SYSTOLIC BLOOD PRESSURE: 166 MMHG | WEIGHT: 315 LBS | BODY MASS INDEX: 39.17 KG/M2

## 2020-12-27 PROCEDURE — 6360000002 HC RX W HCPCS: Performed by: EMERGENCY MEDICINE

## 2020-12-27 PROCEDURE — 6370000000 HC RX 637 (ALT 250 FOR IP): Performed by: EMERGENCY MEDICINE

## 2020-12-27 PROCEDURE — 99283 EMERGENCY DEPT VISIT LOW MDM: CPT

## 2020-12-27 PROCEDURE — 96372 THER/PROPH/DIAG INJ SC/IM: CPT

## 2020-12-27 RX ORDER — KETOROLAC TROMETHAMINE 30 MG/ML
60 INJECTION, SOLUTION INTRAMUSCULAR; INTRAVENOUS ONCE
Status: COMPLETED | OUTPATIENT
Start: 2020-12-27 | End: 2020-12-27

## 2020-12-27 RX ORDER — PENICILLIN V POTASSIUM 500 MG/1
500 TABLET ORAL 4 TIMES DAILY
Qty: 40 TABLET | Refills: 0 | Status: SHIPPED | OUTPATIENT
Start: 2020-12-27 | End: 2021-01-06

## 2020-12-27 RX ORDER — TRAMADOL HYDROCHLORIDE 50 MG/1
50 TABLET ORAL EVERY 6 HOURS PRN
Qty: 12 TABLET | Refills: 0 | Status: SHIPPED | OUTPATIENT
Start: 2020-12-27 | End: 2020-12-30

## 2020-12-27 RX ORDER — NAPROXEN 500 MG/1
500 TABLET ORAL 2 TIMES DAILY PRN
Qty: 60 TABLET | Refills: 0 | Status: SHIPPED | OUTPATIENT
Start: 2020-12-27 | End: 2022-06-12 | Stop reason: ALTCHOICE

## 2020-12-27 RX ORDER — TRAMADOL HYDROCHLORIDE 50 MG/1
50 TABLET ORAL ONCE
Status: COMPLETED | OUTPATIENT
Start: 2020-12-27 | End: 2020-12-27

## 2020-12-27 RX ADMIN — TRAMADOL HYDROCHLORIDE 50 MG: 50 TABLET ORAL at 08:53

## 2020-12-27 RX ADMIN — KETOROLAC TROMETHAMINE 60 MG: 30 INJECTION, SOLUTION INTRAMUSCULAR; INTRAVENOUS at 08:52

## 2020-12-27 ASSESSMENT — PAIN SCALES - GENERAL
PAINLEVEL_OUTOF10: 7
PAINLEVEL_OUTOF10: 10
PAINLEVEL_OUTOF10: 10

## 2020-12-27 ASSESSMENT — PAIN DESCRIPTION - DESCRIPTORS: DESCRIPTORS: DULL

## 2020-12-27 ASSESSMENT — PAIN DESCRIPTION - ORIENTATION: ORIENTATION: RIGHT

## 2020-12-27 ASSESSMENT — PAIN DESCRIPTION - LOCATION: LOCATION: TEETH

## 2020-12-27 ASSESSMENT — PAIN DESCRIPTION - PAIN TYPE: TYPE: ACUTE PAIN

## 2020-12-27 NOTE — ED PROVIDER NOTES
HPI:  12/27/20,   Time: 8:34 AM ANI Whitmore is a 35 y.o. male presenting to the ED for dental pain, beginning 4 days ago. The complaint has been persistent, mild in severity, and worsened by eating and drinking. Pt root canal 6 weeks ago with temp cap, cap fell off, pain to tooth 3 days after fell of, no relief otc meds. No fever, no airway issues/sob. Has appt with dentist in [place] in 2 days    Review of Systems:   Pertinent positives and negatives are stated within HPI, all other systems reviewed and are negative.          --------------------------------------------- PAST HISTORY ---------------------------------------------  Past Medical History:  has a past medical history of Hypertension. Past Surgical History:  has a past surgical history that includes knee surgery and fracture surgery. Social History:  reports that he has never smoked. He has never used smokeless tobacco. He reports current drug use. Drug: Marijuana. He reports that he does not drink alcohol. Family History: family history includes Heart Attack in his maternal grandfather, maternal uncle, maternal uncle, maternal uncle, and maternal uncle; No Known Problems in his father. The patients home medications have been reviewed. Allergies: Patient has no known allergies. ---------------------------------------------------PHYSICAL EXAM--------------------------------------    Constitutional/General: Alert and oriented x3, well appearing, non toxic in NAD  Head: Normocephalic and atraumatic  Eyes: PERRL, EOMI, conjunctive normal, sclera non icteric  Mouth: Oropharynx clear, handling secretions, no trismus, no asymmetry of the posterior oropharynx or uvular edema. Tooth 32 ttp, no cavities noted, no ginigival abscess  Neck: Supple, full ROM, non tender to palpation in the midline, no stridor,   Musculoskeletal: Moves all extremities x 4. Integument: skin warm and dry. No rashes.    Lymphatic: no lymphadenopathy noted  Neurologic: GCS 15, no focal deficits, Psychiatric: Normal Affect    -------------------------------------------------- RESULTS -------------------------------------------------  I have personally reviewed all laboratory and imaging results for this patient. Results are listed below. LABS:  No results found for this visit on 12/27/20. RADIOLOGY:  Interpreted by Radiologist.  No orders to display       EKG: This EKG is signed and interpreted by the EP. Time:   Rate:   Rhythm:   Interpretation:   Comparison: None      ------------------------- NURSING NOTES AND VITALS REVIEWED ---------------------------   The nursing notes within the ED encounter and vital signs as below have been reviewed by myself. BP (!) 166/94   Pulse 65   Temp 97.2 °F (36.2 °C) (Infrared)   Resp 16   Ht 6' 3\" (1.905 m)   Wt (!) 330 lb (149.7 kg)   SpO2 98%   BMI 41.25 kg/m²   Oxygen Saturation Interpretation: nml    The patients available past medical records and past encounters were reviewed. ------------------------------ ED COURSE/MEDICAL DECISION MAKING----------------------  Medications   ketorolac (TORADOL) injection 60 mg (has no administration in time range)   traMADol (ULTRAM) tablet 50 mg (has no administration in time range)         ED COURSE:       Medical Decision Making:    \no fever, no airway compromise, dc on analgesia/abx until dental fu      This patient's ED course included: a personal history and physicial examination    This patient has remained hemodynamically stable during their ED course. Counseling: The emergency provider has spoken with the patient and discussed todays results, in addition to providing specific details for the plan of care and counseling regarding the diagnosis and prognosis.   Questions are answered at this time and they are agreeable with the plan.       --------------------------------- IMPRESSION AND DISPOSITION ---------------------------------    IMPRESSION  1. Pain, dental        DISPOSITION  Disposition: Discharge to home  Patient condition is stable    NOTE: This report was transcribed using voice recognition software.  Every effort was made to ensure accuracy; however, inadvertent computerized transcription errors may be present        Lorraine Eisenmenger, MD  12/27/20 [id number]

## 2021-07-06 ENCOUNTER — OFFICE VISIT (OUTPATIENT)
Dept: FAMILY MEDICINE CLINIC | Age: 31
End: 2021-07-06
Payer: COMMERCIAL

## 2021-07-06 VITALS
DIASTOLIC BLOOD PRESSURE: 77 MMHG | TEMPERATURE: 97.3 F | BODY MASS INDEX: 39.17 KG/M2 | SYSTOLIC BLOOD PRESSURE: 124 MMHG | OXYGEN SATURATION: 100 % | RESPIRATION RATE: 18 BRPM | HEART RATE: 83 BPM | HEIGHT: 75 IN | WEIGHT: 315 LBS

## 2021-07-06 DIAGNOSIS — F51.01 PRIMARY INSOMNIA: Primary | ICD-10-CM

## 2021-07-06 DIAGNOSIS — Z56.6 WORK-RELATED STRESS: ICD-10-CM

## 2021-07-06 PROCEDURE — 99213 OFFICE O/P EST LOW 20 MIN: CPT | Performed by: NURSE PRACTITIONER

## 2021-07-06 PROCEDURE — G8417 CALC BMI ABV UP PARAM F/U: HCPCS | Performed by: NURSE PRACTITIONER

## 2021-07-06 PROCEDURE — 1036F TOBACCO NON-USER: CPT | Performed by: NURSE PRACTITIONER

## 2021-07-06 PROCEDURE — G8427 DOCREV CUR MEDS BY ELIG CLIN: HCPCS | Performed by: NURSE PRACTITIONER

## 2021-07-06 RX ORDER — TEMAZEPAM 15 MG/1
15 CAPSULE ORAL NIGHTLY PRN
Qty: 14 CAPSULE | Refills: 0 | Status: SHIPPED
Start: 2021-07-06 | End: 2021-08-18 | Stop reason: SDUPTHER

## 2021-07-06 SDOH — ECONOMIC STABILITY: FOOD INSECURITY: WITHIN THE PAST 12 MONTHS, THE FOOD YOU BOUGHT JUST DIDN'T LAST AND YOU DIDN'T HAVE MONEY TO GET MORE.: NEVER TRUE

## 2021-07-06 SDOH — HEALTH STABILITY - MENTAL HEALTH: OTHER PHYSICAL AND MENTAL STRAIN RELATED TO WORK: Z56.6

## 2021-07-06 SDOH — ECONOMIC STABILITY: FOOD INSECURITY: WITHIN THE PAST 12 MONTHS, YOU WORRIED THAT YOUR FOOD WOULD RUN OUT BEFORE YOU GOT MONEY TO BUY MORE.: NEVER TRUE

## 2021-07-06 ASSESSMENT — ENCOUNTER SYMPTOMS
EYE PAIN: 0
WHEEZING: 0
RECTAL PAIN: 0
COUGH: 0
TROUBLE SWALLOWING: 0
EYE REDNESS: 0
ABDOMINAL PAIN: 0
SINUS PRESSURE: 0
CHEST TIGHTNESS: 0
COLOR CHANGE: 0
DIARRHEA: 0
BLOOD IN STOOL: 0
RHINORRHEA: 0
ABDOMINAL DISTENTION: 0
VOICE CHANGE: 0
STRIDOR: 0
NAUSEA: 0
CONSTIPATION: 0
PHOTOPHOBIA: 0
ANAL BLEEDING: 0
EYE DISCHARGE: 0
VOMITING: 0
SHORTNESS OF BREATH: 0
SORE THROAT: 0
EYE ITCHING: 0
CHOKING: 0
SINUS PAIN: 0
APNEA: 0

## 2021-07-06 ASSESSMENT — PATIENT HEALTH QUESTIONNAIRE - PHQ9
SUM OF ALL RESPONSES TO PHQ QUESTIONS 1-9: 0
SUM OF ALL RESPONSES TO PHQ QUESTIONS 1-9: 0
1. LITTLE INTEREST OR PLEASURE IN DOING THINGS: 0
SUM OF ALL RESPONSES TO PHQ QUESTIONS 1-9: 0
SUM OF ALL RESPONSES TO PHQ9 QUESTIONS 1 & 2: 0
2. FEELING DOWN, DEPRESSED OR HOPELESS: 0

## 2021-07-06 ASSESSMENT — SOCIAL DETERMINANTS OF HEALTH (SDOH): HOW HARD IS IT FOR YOU TO PAY FOR THE VERY BASICS LIKE FOOD, HOUSING, MEDICAL CARE, AND HEATING?: NOT HARD AT ALL

## 2021-07-06 NOTE — PROGRESS NOTES
Jennifer Landeros is a 35 y.o. male who presents today for   Chief Complaint   Patient presents with    Insomnia    Stress         HPI    Insomnia:  Current treatment: decreasing caffeine consumption, over the counter diphenhydramine and melatonin use, which has been ineffective. Medication side effects: none. Pt stated he is experiencing a lot of work related stress which he thinks may be causing insomnia. Pt stated he is having difficulty falling and staying asleep    [address]:  Patient's past medical, surgical, social and/or family history reviewed, updated in chart, and are non-contributory (unless otherwise stated). Medications and allergies also reviewed and updated in chart. Review of Systems  Review of Systems   Constitutional: Negative for activity change, appetite change, chills, diaphoresis, fatigue, fever and unexpected weight change. HENT: Negative for congestion, ear discharge, ear pain, hearing loss, mouth sores, nosebleeds, rhinorrhea, sinus pressure, sinus pain, sneezing, sore throat, tinnitus, trouble swallowing and voice change. Eyes: Negative for photophobia, pain, discharge, redness, itching and visual disturbance. Respiratory: Negative for apnea, cough, choking, chest tightness, shortness of breath, wheezing and stridor. Cardiovascular: Negative for chest pain, palpitations and leg swelling. Family h/o CAD   Gastrointestinal: Negative for abdominal distention, abdominal pain, anal bleeding, blood in stool, constipation, diarrhea, nausea, rectal pain and vomiting. Skin: Negative for color change, pallor and rash. Neurological: Negative for dizziness, tremors, seizures, syncope, facial asymmetry, speech difficulty, weakness, light-headedness, numbness and headaches. Psychiatric/Behavioral: Positive for sleep disturbance. Negative for decreased concentration, dysphoric mood, self-injury and suicidal ideas. The patient is not nervous/anxious.          C/o stress-work related       Physical Exam:    VS:  /77 (Site: Right Upper Arm, Position: Sitting, Cuff Size: Large Adult)   Pulse 83   Temp 97.3 °F (36.3 °C) (Temporal)   Resp 18   Ht 6' 3\" (1.905 m)   Wt (!) 325 lb 4.8 oz (147.6 kg)   SpO2 100%   BMI 40.66 kg/m²   LAST WEIGHT:  Wt Readings from Last 3 Encounters:   07/06/21 (!) 325 lb 4.8 oz (147.6 kg)   12/27/20 (!) 330 lb (149.7 kg)   07/30/20 (!) 330 lb (149.7 kg)     Physical Exam  Vitals and nursing note reviewed. Constitutional:       General: He is not in acute distress. Appearance: Normal appearance. He is well-developed. He is not ill-appearing, toxic-appearing or diaphoretic. HENT:      Head: Normocephalic and atraumatic. Mouth/Throat:      Mouth: Mucous membranes are moist.      Pharynx: Oropharynx is clear. No oropharyngeal exudate or posterior oropharyngeal erythema. Eyes:      General:         Right eye: No discharge. Left eye: No discharge. Conjunctiva/sclera: Conjunctivae normal.   Neck:      Thyroid: No thyromegaly. Cardiovascular:      Rate and Rhythm: Normal rate and regular rhythm. Pulses: Normal pulses. Heart sounds: Normal heart sounds. No murmur heard. Comments: No peripheral edema  Pulmonary:      Effort: Pulmonary effort is normal. No respiratory distress. Breath sounds: Normal breath sounds. No stridor. No wheezing, rhonchi or rales. Chest:      Chest wall: No tenderness. Musculoskeletal:      Cervical back: Normal range of motion and neck supple. No rigidity or tenderness. No muscular tenderness. Lymphadenopathy:      Cervical: No cervical adenopathy. Skin:     General: Skin is warm and dry. Coloration: Skin is not jaundiced or pale. Findings: No bruising, erythema, lesion or rash. Neurological:      Mental Status: He is alert and oriented to person, place, and time. Motor: No weakness.       Gait: Gait normal.      Deep Tendon Reflexes: Reflexes are normal and symmetric. Psychiatric:         Mood and Affect: Mood normal.         Behavior: Behavior normal.         Thought Content: Thought content normal.         Judgment: Judgment normal.             Assessment / Plan:      Kamran Camp was seen today for insomnia and stress. Diagnoses and all orders for this visit:    Primary insomnia  Will trial Restoril  Advised on side effects  [place] report reviewed  -     temazepam (RESTORIL) 15 MG capsule; Take 1 capsule by mouth nightly as needed for Sleep for up to 14 days. Work-related stress         Controlled Substance Monitoring:    Acute and Chronic Pain Monitoring:   RX Monitoring 7/6/2021   Periodic Controlled Substance Monitoring Possible medication side effects, risk of tolerance/dependence & alternative treatments discussed. ;No signs of potential drug abuse or diversion identified. ;Assessed functional status. Call or go to ED immediately if symptoms worsen or persist.    Return if symptoms worsen or fail to improve. , or sooner if necessary. Educational materials and/or home exercises printed for patient's review and were included in patient instructions on his/her After Visit Summary and given to patient at the end of visit. Counseled regarding above diagnosis, including possible risks and complications,  especially if left uncontrolled. Counseled regarding the possible side effects, risks, benefits and alternatives to treatment; patient and/or guardian verbalizes understanding, agrees, feels comfortable with and wishes to proceed with above treatment plan. Advised patient to call with any new medication issues, and read all Rx info from pharmacy to assure aware of all possible risks and side effects of medication before taking. Reviewed age and gender appropriate health screening exams and vaccinations.   Advised patient regarding importance of keeping up with recommended health maintenance and to schedule as soon as possible if

## 2021-08-18 DIAGNOSIS — F51.01 PRIMARY INSOMNIA: ICD-10-CM

## 2021-08-18 RX ORDER — TEMAZEPAM 15 MG/1
15 CAPSULE ORAL NIGHTLY PRN
Qty: 30 CAPSULE | Refills: 2 | Status: SHIPPED | OUTPATIENT
Start: 2021-08-18 | End: 2021-11-16

## 2022-06-06 ENCOUNTER — OFFICE VISIT (OUTPATIENT)
Dept: ORTHOPEDIC SURGERY | Age: 32
End: 2022-06-06

## 2022-06-06 VITALS — WEIGHT: 315 LBS | BODY MASS INDEX: 39.17 KG/M2 | HEIGHT: 75 IN

## 2022-06-06 DIAGNOSIS — G56.03 BILATERAL CARPAL TUNNEL SYNDROME: Primary | ICD-10-CM

## 2022-06-06 PROCEDURE — 99214 OFFICE O/P EST MOD 30 MIN: CPT | Performed by: ORTHOPAEDIC SURGERY

## 2022-06-06 PROCEDURE — G8417 CALC BMI ABV UP PARAM F/U: HCPCS | Performed by: ORTHOPAEDIC SURGERY

## 2022-06-06 PROCEDURE — 1036F TOBACCO NON-USER: CPT | Performed by: ORTHOPAEDIC SURGERY

## 2022-06-06 PROCEDURE — G8427 DOCREV CUR MEDS BY ELIG CLIN: HCPCS | Performed by: ORTHOPAEDIC SURGERY

## 2022-06-06 NOTE — PATIENT INSTRUCTIONS
Patient Education        Carpal Tunnel Release: Before Your Surgery  What is carpal tunnel release? Carpal tunnel surgery reduces the pressure on a nerve in the wrist. Your doctor will cut a ligament that presses on the nerve. This lets the nerve pass freely through the tunnel without being squeezed. This is also called carpal tunnelrelease surgery. The surgery can be open or endoscopic. In open surgery, your doctor makes a small cut in the palm of your hand. This cut is called an incision. In endoscopic surgery, your doctor makes one small incision in the wrist. Or you may have one small incision in the wrist and one in the palm. Your doctor puts a thin tube with a camera attached (endoscope) into the incision. Surgicaltools are put in along with the endoscope. In both types of surgeries, the incisions are closed with stitches. Theincisions leave scars that usually fade in time. You may be asleep during the surgery. Or you may be awake and have medicine tonumb your hand and arm so you won't feel pain. After surgery, your wrist and hand pain should start to go away. It usually takes 3 to 4 months to recover and 1 year before your hand strength returns. How much hand strength returns is different for each person. You will go home the same day as the surgery. When you can go back to workdepends on the type of work you do. Follow-up care is a key part of your treatment and safety. Be sure to make and go to all appointments, and call your doctor if you are having problems. It's also a good idea to know your test results and keep alist of the medicines you take. How do you prepare for surgery? Surgery can be stressful. This information will help you understand what youcan expect. And it will help you safely prepare for surgery. Preparing for surgery     Be sure you have someone to take you home.  Anesthesia and pain medicine will make it unsafe for you to drive or get home on your own.      Understand changed your mind about having the surgery. Where can you learn more? Go to [email or web address] and sign in to your [employer] account. Enter T049 in the [facility] box to learn more about \"Carpal Tunnel Release: Before Your Surgery. \"     If you do not have an account, please click on the \"Sign Up Now\" link. Current as of: July 1, 2021               Content Version: 13.2  © 2006-2022 Healthwise, [employer]. Care instructions adapted under license by [facility] ([facility]). If you have questions about a medical condition or this instruction, always ask your healthcare professional. [place] [address] any warranty or liability for your use of this information. Patient Education        Carpal Tunnel Release: What to Expect at [facility] tunnel reduces the pressure on a nerve in the wrist. Your doctor cut a ligament that presses on the nerve. This lets the nerve pass freely through thetunnel without being squeezed. Your hand will hurt and may feel weak with some numbness. This usually goes away in a few days, but it may take several months. Your doctor may remove the large bandage, or he or she will tell you when and how to remove it yourself. In some cases, you may have a splint. If you have one, you will wear it forabout 2 weeks. Your doctor will take out your stitches in 1 to 2 weeks. Your hand and wrist may feel worse than they used to feel. But the pain should start to go away. It usually takes 3 to 4 months to recover and up to 1 year before hand strengthreturns. How much strength returns will vary. The timing of your return to work depends on the type of surgery you had, whether the surgery was on your dominant hand (the hand you use most), and yourwork activities. If you had open surgery on your dominant hand and you do repeated actions at work, you may be able to go back to work in 10 to 8 weeks.  Repeated motions include typing or assembly-line work. If the surgery was on the other hand and you don't do repeated actions at work, you may be able to return to work in [facility] 14 days. If you had endoscopic surgery, you may be able to go back to work sooner thanwith open surgery. This care sheet gives you a general idea about how long it will take for you to recover. But each person recovers at a different pace. Follow the steps belowto get better as quickly as possible. How can you care for yourself at home? Activity     Rest when you feel tired. Getting enough sleep will help you recover.      Try to walk each day. Start by walking a little more than you did the day before. Bit by bit, increase the amount you walk.      For up to 2 weeks after surgery, avoid lifting things heavier than 1 to 2 pounds and using your hand. This includes doing repeated arm or hand movements, such as typing or using a computer mouse, washing windows, vacuuming, or chopping food. Do not use power tools, and avoid activities that cause vibration.      You may do heavier tasks about 4 weeks after surgery. These include vacuuming, mowing the lawn, and gardening.      You may shower 24 to 48 hours after surgery, if your doctor okays it. Keep your bandage dry by taping a sheet of plastic to cover it. If you have a splint, keep it dry. Your doctor will tell you if you can remove it when you shower. Be careful not to put the splint on too tight. Do not take a bath until the incision heals, or until your doctor tells you it is okay.      You may drive when you are fully able to use your hand. Diet     You can eat your normal diet. If your stomach is upset, try bland, low-fat foods like plain rice, broiled chicken, toast, and yogurt. Medicines     Your doctor will tell you if and when you can restart your medicines.  He or she will also give you instructions about taking any new medicines.      If you take aspirin or some other blood thinner, ask your doctor if and when to start taking it again. Make sure that you understand exactly what your doctor wants you to do.      Take pain medicines exactly as directed. ? If the doctor gave you a prescription medicine for pain, take it as prescribed. ? If you are not taking a prescription pain medicine, take an over-the-counter medicine such as acetaminophen (Tylenol), ibuprofen (Advil, Motrin), or naproxen (Aleve). Read and follow all instructions on the label. ? Do not take two or more pain medicines at the same time unless the doctor told you to. Many pain medicines have acetaminophen, which is Tylenol. Too much acetaminophen (Tylenol) can be harmful.      If you think your pain medicine is making you sick to your stomach:  ? Take your medicine after meals (unless your doctor has told you not to). ? Ask your doctor for a different pain medicine.      If your doctor prescribed antibiotics, take them as directed. Do not stop taking them just because you feel better. You need to take the full course of antibiotics. Incision and splint care     Keep your bandage dry. If it gets dirty, you may change it.      If you have a splint, talk to your doctor about when you should wear it. Exercise     You may need wrist and hand rehabilitation. This is a series of exercises you do after your surgery. This helps you get back your wrist's and hand's range of motion, strength, and . You will work with your doctor and physical or occupational therapist to plan this exercise program. To get the best results, you need to do the exercises correctly and as often and as long as your doctor tells you. Ice and elevation     Put ice or a cold pack on your wrist for 10 to 20 minutes at a time. Try to do this every 1 to 2 hours for the next 3 days (when you are awake) or until the swelling goes down.  Put a thin cloth between the ice and your skin.      Prop up the sore wrist on a pillow when you ice it or anytime you sit or lie down during the next 3 days. Try to keep it above the level of your heart. This will help reduce swelling. Other instructions     Avoid letting your hand hang down. This can cause swelling. Follow-up care is a key part of your treatment and safety. Be sure to make and go to all appointments, and call your doctor if you are having problems. It's also a good idea to know your test results and keep alist of the medicines you take. When should you call for help? Call 911 anytime you think you may need emergency care. For example, call if:     You passed out (lost consciousness).      You have chest pain, are short of breath, or cough up blood. Call your doctor now or seek immediate medical care if:     You have pain that does not get better after you take pain medicine.      Your hand is cool or pale or changes color.      Your cast or splint feels too tight.      You have tingling, weakness, or numbness in your hand or fingers.      You are sick to your stomach or cannot drink fluids.      You have loose stitches, or your incision comes open.      You have signs of a blood clot in your leg (called a deep vein thrombosis), such as:  ? Pain in your calf, back of the knee, thigh, or groin. ? Redness or swelling in your leg.      You have signs of infection, such as:  ? Increased pain, swelling, warmth, or redness. ? Red streaks leading from the incision. ? Pus draining from the incision. ? A fever.      Bright red blood has soaked through the bandage over your incision. Watch closely for any changes in your health, and be sure to contact yourdoctor if:     You have a problem with your cast or splint.      You do not get better as expected. Where can you learn more? Go to https://[employer] [email or web address].[email or web address]. org and sign in to your [employer] account. Enter N388 in the [facility] box to learn more about \"Carpal Tunnel Release: What to Expect at Home. \"     If you do not have an account, please click on the \"Sign Up Now\" link. Current as of: July 1, 2021               Content Version: 13.2  © 2006-2022 Healthwise, Inc[employer]. Care instructions adapted under license by [facility] ([facility]). If you have questions about a medical condition or this instruction, always ask your healthcare professional. [address] any warranty or liability for your use of this information.

## 2022-06-06 NOTE — PROGRESS NOTES
Department of Orthopedic Surgery  History and Physical      CHIEF COMPLAINT: Right hand numbness and tingling    HISTORY OF PRESENT ILLNESS:                The patient is a left-hand-dominant 28 y.o. male who presents with right hand numbness and tingling. Patient has had symptoms for approximately 2 years. He initially wanted to schedule surgical intervention for his carpal tunnel but canceled secondary to concerns with COVID. Patient returns today with continued complaints of diminished sensation to the right thumb, index, and middle fingers. He continues to experience night paresthesias and numbness. Night bracing has failed to relieve his symptoms. He works as a  and notes that his symptoms have been affecting his ADLs his ability to perform his job. Denies any recent trauma. Patient did have an EMG nerve conduction study test dated March 11 of 2020. Right median nerve motor latency was 6.25 and left was 4.58. Right median nerve sensory latency was non-recordable and left was 4.32. Right ulnar nerve velocities 87 m/s above the elbow and 64 m/s below the elbow. On the EMG portion of the exam he did have 1+ dur and fwe PPP and decrease in recruitment pattern to the right APB. This is consistent with right moderate carpal tunnel syndrome and left mild carpal tunnel syndrome. Past Medical History:        Diagnosis Date    Hypertension      Past Surgical History:        Procedure Laterality Date    FRACTURE SURGERY      right wrist, left knee, right leg    KNEE SURGERY       Current Medications:   No current facility-administered medications for this visit. Allergies:  Patient has no known allergies. Social History:   TOBACCO:   reports that he has never smoked. He has never used smokeless tobacco.  ETOH:   reports no history of alcohol use. DRUGS:   reports current drug use. Drug: Marijuana Natasha Ing).   ACTIVITIES OF DAILY LIVING:    OCCUPATION:    Family History:       Problem Relation Age of Onset    No Known Problems Father     Heart Attack Maternal Grandfather     Heart Attack Maternal Uncle     Heart Attack Maternal Uncle     Heart Attack Maternal Uncle     Heart Attack Maternal Uncle        REVIEW OF SYSTEMS:  CONSTITUTIONAL:  negative  EYES:  negative  HEENT:  negative  RESPIRATORY:  negative  CARDIOVASCULAR:  htn  GASTROINTESTINAL:  negative  GENITOURINARY:  negative  INTEGUMENT/BREAST:  negative  HEMATOLOGIC/LYMPHATIC:  negative  ALLERGIC/IMMUNOLOGIC:  negative  ENDOCRINE:  negative  MUSCULOSKELETAL:  pain  NEUROLOGICAL:  N/T  BEHAVIOR/PSYCH:  negative    PHYSICAL EXAM:    VITALS:  Ht 6' 3\" (1.905 m)   Wt (!) 320 lb (145.2 kg)   BMI 40.00 kg/m²   CONSTITUTIONAL:  awake, alert, cooperative, no apparent distress, and appears stated age  EYES:  Lids and lashes normal, pupils equal, round and reactive to light, extra ocular muscles intact, sclera clear, conjunctiva normal  ENT:  Normocephalic, without obvious abnormality, atraumatic, sinuses nontender on palpation, external ears without lesions, oral pharynx with moist mucus membranes, tonsils without erythema or exudates, gums normal and good dentition. NECK:  Supple, symmetrical, trachea midline, no adenopathy, thyroid symmetric, not enlarged and no tenderness, skin normal  LUNGS:  CTA  CARDIOVASCULAR:  2+ radial pulses, extremities warm and well perfused  ABDOMEN: Nondistended, NTTP  CHEST:  Atraumatic   GENITAL/URINARY:  deferred  NEUROLOGIC:  Awake, alert, oriented to name, place and time. Cranial nerves II-XII are grossly intact. Motor is 5 out of 5 bilaterally. Sensory is intact.  gait is normal.  MUSCULOSKELETAL:    Right upper extremity:  Non-tender about the shoulder and elbow with good ROM. + tinels of the cubital tunnel, + tinels of the carpal tunnel, + durkans, - finkelsteins, - CMC grind, - tenderness over the A1 pulleys with no active triggering. Full flexion and extension of the fingers.  - wartenbergs and cross finger testing, APB strength 4-/5 with no atrophy. Diminished sensation to the thumb, index, and middle fingers. ulnar, radial n intact to light touch. Brisk capillary refill. Gross motor 5/5. Left upper extremity:  Non-tender about the shoulder and elbow with good ROM. - tinels of the cubital tunnel, + tinels of the carpal tunnel, + durkans, - finkelsteins, - CMC grind, - tenderness over the A1 pulleys with no active triggering. Full flexion and extension of the fingers. - wartenbergs and cross finger testing, APB strength 5/5 with no atrophy. Diminished sensation to the thumb, index, and middle fingers. ulnar, radial n intact to light touch. Brisk capillary refill. Gross motor 5/5. DATA:    CBC:   Lab Results   Component Value Date    WBC 6.9 02/29/2020    RBC 5.50 02/29/2020    HGB 16.3 02/29/2020    HCT 48.6 02/29/2020    MCV 88.4 02/29/2020    MCH 29.6 02/29/2020    MCHC 33.5 02/29/2020    RDW 12.1 02/29/2020     02/29/2020    MPV 10.5 02/29/2020     PT/INR:  No results found for: PROTIME, INR    Radiology Review:   Xray: x-rays of the bilateral wrists were obtained today in the office and reviewed with the patient. 4 views: AP/oblique/lateral/carpal tunnel views: demonstrate no acute osseous abnormalities. No fractures or dislocations appreciated. Impression: No acute osseous abnormalities    IMPRESSION:  · Bilateral carpal tunnel syndrome right worse than left  · Right ulnar nerve neuritis at the elbow  · Hypertension    PLAN:  Discussed findings with patient. Discussed conservative and surgical management with the patient. Patient would like to proceed with surgical management. We will plan for a right endoscopic carpal tunnel release. Postoperative course explained to the patient. Patient like to proceed. All questions answered.  The risks, benefits, alternatives and complications of surgery were discussed with the patient including but not limited to the risks of infection, possible damage to nerves, vessels, or tendons, stiffness, loss of range of motion, scar sensitivity, wound healing complications, worsening symptoms, possible need for therapy, as well as the possible need further surgery and unanticipated complications. The patient voiced understanding and all questions were answered. The patient elected to proceed with surgical intervention. All questions were sought and answered today. I explained the risks, benefits, alternatives and complications of surgery with the patient including but not limited to the risks of infection, possible damage to nerves, vessels, or tendons, stiffness, loss of range of motion, scar sensitivity, wound healing complications, worsening symptoms, possible need for therapy, as well as the possible need further surgery and unanticipated complications. The patient voiced understanding and all questions were answered. The patient elected to proceed with surgical intervention. I have seen and evaluated the patient and agree with the above assessment and plan on today's visit. I have performed the key components of the history and physical examination with significant findings of right worse than left carpal tunnel syndrome. Does have some ulnar neuritis on the right. However his EMG nerve conduction study from 2 years ago demonstrated normal velocities. We will continue to observe the right ulnar neuritis. We will plan for right endoscopic carpal tunnel release. . I concur with the findings and plan as documented.     Jaspreet Haynes MD  6/6/2022

## 2022-06-12 ENCOUNTER — HOSPITAL ENCOUNTER (EMERGENCY)
Age: 32
Discharge: HOME | End: 2022-06-12
Attending: STUDENT IN AN ORGANIZED HEALTH CARE EDUCATION/TRAINING PROGRAM
Payer: COMMERCIAL

## 2022-06-12 VITALS
BODY MASS INDEX: 40.43 KG/M2 | DIASTOLIC BLOOD PRESSURE: 70 MMHG | OXYGEN SATURATION: 100 % | TEMPERATURE: 98.2 F | RESPIRATION RATE: 18 BRPM | WEIGHT: 315 LBS | HEART RATE: 76 BPM | SYSTOLIC BLOOD PRESSURE: 130 MMHG | HEIGHT: 74 IN

## 2022-06-12 DIAGNOSIS — T15.92XA FOREIGN BODY OF LEFT EXTERNAL EYE, INITIAL ENCOUNTER: Primary | ICD-10-CM

## 2022-06-12 DIAGNOSIS — R03.0 ELEVATED BLOOD PRESSURE READING: ICD-10-CM

## 2022-06-12 DIAGNOSIS — H18.892 CORNEAL RUST RING OF LEFT EYE: ICD-10-CM

## 2022-06-12 PROCEDURE — 90471 IMMUNIZATION ADMIN: CPT | Performed by: STUDENT IN AN ORGANIZED HEALTH CARE EDUCATION/TRAINING PROGRAM

## 2022-06-12 PROCEDURE — 90714 TD VACC NO PRESV 7 YRS+ IM: CPT | Performed by: STUDENT IN AN ORGANIZED HEALTH CARE EDUCATION/TRAINING PROGRAM

## 2022-06-12 PROCEDURE — 99284 EMERGENCY DEPT VISIT MOD MDM: CPT

## 2022-06-12 PROCEDURE — 6360000002 HC RX W HCPCS: Performed by: STUDENT IN AN ORGANIZED HEALTH CARE EDUCATION/TRAINING PROGRAM

## 2022-06-12 PROCEDURE — 6370000000 HC RX 637 (ALT 250 FOR IP): Performed by: STUDENT IN AN ORGANIZED HEALTH CARE EDUCATION/TRAINING PROGRAM

## 2022-06-12 RX ORDER — TETRACAINE HYDROCHLORIDE 5 MG/ML
SOLUTION OPHTHALMIC
Status: DISCONTINUED
Start: 2022-06-12 | End: 2022-06-13 | Stop reason: HOSPADM

## 2022-06-12 RX ORDER — CIPROFLOXACIN HYDROCHLORIDE 3.5 MG/ML
2 SOLUTION/ DROPS TOPICAL ONCE
Status: COMPLETED | OUTPATIENT
Start: 2022-06-12 | End: 2022-06-12

## 2022-06-12 RX ORDER — CIPROFLOXACIN HYDROCHLORIDE 3.5 MG/ML
1 SOLUTION/ DROPS TOPICAL
Qty: 5 ML | Refills: 0 | Status: SHIPPED | OUTPATIENT
Start: 2022-06-12 | End: 2022-06-12 | Stop reason: SDUPTHER

## 2022-06-12 RX ORDER — TETRACAINE HYDROCHLORIDE 5 MG/ML
1 SOLUTION OPHTHALMIC ONCE
Status: COMPLETED | OUTPATIENT
Start: 2022-06-12 | End: 2022-06-12

## 2022-06-12 RX ORDER — CIPROFLOXACIN HYDROCHLORIDE 3.5 MG/ML
2 SOLUTION/ DROPS TOPICAL EVERY 4 HOURS
Qty: 5 ML | Refills: 0 | Status: SHIPPED | OUTPATIENT
Start: 2022-06-12 | End: 2022-06-17

## 2022-06-12 RX ORDER — TETANUS AND DIPHTHERIA TOXOIDS ADSORBED 2; 2 [LF]/.5ML; [LF]/.5ML
0.5 INJECTION INTRAMUSCULAR ONCE
Status: COMPLETED | OUTPATIENT
Start: 2022-06-12 | End: 2022-06-12

## 2022-06-12 RX ADMIN — CIPROFLOXACIN 2 DROP: 3 SOLUTION OPHTHALMIC at 23:22

## 2022-06-12 RX ADMIN — TETANUS AND DIPHTHERIA TOXOIDS ADSORBED 0.5 ML: 2; 2 INJECTION INTRAMUSCULAR at 22:11

## 2022-06-12 RX ADMIN — TETRACAINE HYDROCHLORIDE 1 DROP: 5 SOLUTION OPHTHALMIC at 22:11

## 2022-06-12 RX ADMIN — FLUORESCEIN SODIUM 1 MG: 1 STRIP OPHTHALMIC at 22:11

## 2022-06-12 ASSESSMENT — PAIN SCALES - GENERAL: PAINLEVEL_OUTOF10: 7

## 2022-06-12 ASSESSMENT — PAIN - FUNCTIONAL ASSESSMENT
PAIN_FUNCTIONAL_ASSESSMENT: NONE - DENIES PAIN
PAIN_FUNCTIONAL_ASSESSMENT: ACTIVITIES ARE NOT PREVENTED
PAIN_FUNCTIONAL_ASSESSMENT: 0-10

## 2022-06-12 ASSESSMENT — PAIN DESCRIPTION - FREQUENCY: FREQUENCY: CONTINUOUS

## 2022-06-12 ASSESSMENT — PAIN DESCRIPTION - PAIN TYPE: TYPE: ACUTE PAIN

## 2022-06-12 ASSESSMENT — PAIN DESCRIPTION - LOCATION: LOCATION: EYE

## 2022-06-12 ASSESSMENT — VISUAL ACUITY
OU: 20/20
OD: 20/30
OS: 20/30

## 2022-06-12 ASSESSMENT — PAIN DESCRIPTION - DESCRIPTORS: DESCRIPTORS: BURNING;ACHING

## 2022-06-12 ASSESSMENT — PAIN DESCRIPTION - ORIENTATION: ORIENTATION: LEFT

## 2022-06-12 ASSESSMENT — PAIN DESCRIPTION - ONSET: ONSET: ON-GOING

## 2022-06-13 NOTE — ED PROVIDER NOTES
Department of Emergency Medicine   ED  Provider Note  Admit Date/RoomTime: 6/12/2022  9:16 PM  ED Room: 14/14          History of Present Illness:  6/13/22, Time: 12:17 AM EDT  Chief Complaint   Patient presents with    Eye Problem     cutting pipes got metal in eye, left eye painful       Florian Choudhary is a 28 y.o. male presenting to the ED for foreign body sensation of the left eye. The patient states he is a  and from 11:00 AM till 3 PM he was cutting metal.  He was wearing eye protection. After work, 2 hours ago he started feeling foreign body sensation of the left eye. He is not sure when exactly it occurred. It is a stabbing sensation that is worse if he presses on his eye or blinks. He has had metal in his eye in the past.  He denies any blurred vision. He is having clear drainage from the eye but denies any purulent drainage. No other trauma including head injury, headache, numbness, tingling or weakness. Patient's last tetanus he cannot recall. He does not wear glasses or contacts. Review of Systems:   Constitutional symptoms: Denies fever  Eyes: Denies eye pain  Ears, Nose, Mouth, Throat: Positive for left eye foreign body sensation  Cardiovascular: Denies chest pain  Respiratory: Denies shortness of breath  Gastrointestinal: Denies blood per rectum  Genitourinary: Denies hematuria  Skin: Denies rashes  Neurological: Denies headache  Musculoskeletal: Denies extremity pain    --------------------------------------------- PAST HISTORY ---------------------------------------------  Past Medical History:  has a past medical history of Hypertension. Past Surgical History:  has a past surgical history that includes knee surgery and fracture surgery. Social History:  reports that he has never smoked. He has never used smokeless tobacco. He reports current drug use. Drug: Marijuana Zollie Axe). He reports that he does not drink alcohol.     Family History: family history includes Heart Attack in his maternal grandfather, maternal uncle, maternal uncle, maternal uncle, and maternal uncle; No Known Problems in his father. . Unless otherwise noted, family history is non contributory    The patients home medications have been reviewed. Allergies: Patient has no known allergies. I have reviewed the past medical history, past surgical history, social history, and family history    ---------------------------------------------------PHYSICAL EXAM--------------------------------------    General: The patient is conversational and lying comfortably in bed. Head: Normocephalic and atraumatic. Eyes: Patient has left eye conjunctival injection worse along the medial aspect of the sclera. Patient has a visible foreign body that appears to be metal with surrounding rust ring along the medial iris and sclera border. No proptosis or hyphema. No hypopyon. Extraocular eye movements intact. Patient able to finger count in all 4 quadrants. No foreign bodies along the eyelids. Mild tearing noted. Negative Sara sign. Visual acuity right eye 20/30, left eye 20/30 and bilateral 20/20  ENT: Nasal and oral membranes moist  Neck: Trachea midline. No JVD  Respiratory: Lungs clear to auscultation bilaterally. Patient speaking in full sentences. Cardiovascular: Regular rate. No pedal edema. Gastrointestinal:  Abdomen is soft and nondistended. Bowel sounds present. There is no tenderness. There is no guarding or rebound. Musculoskeletal: No deformity  Skin: Skin warm and dry. No rashes. Neurologic: No gross motor deficits. No aphasia. Psychiatric: Normal affect.    -------------------------------------------------- RESULTS -------------------------------------------------  I have personally reviewed all laboratory and imaging results for this patient. Results are listed below.      LABS: (Lab results interpreted by me)  No results found for this visit on 06/12/22.,     RADIOLOGY:  Interpreted by Radiologist unless otherwise specified  No orders to display       ------------------------- NURSING NOTES AND VITALS REVIEWED ---------------------------   The nursing notes within the ED encounter and vital signs as below have been reviewed by myself  /70   Pulse 76   Temp 98.2 °F (36.8 °C) (Oral)   Resp 18   Ht 6' 2\" (1.88 m)   Wt (!) 320 lb (145.2 kg)   SpO2 100%   BMI 41.09 kg/m²     Oxygen Saturation Interpretation: Normal    The patients available past medical records and past encounters were reviewed. ------------------------------ ED COURSE/MEDICAL DECISION MAKING----------------------  Medications   tetracaine (TETRAVISC) 0.5 % ophthalmic solution (has no administration in time range)   diptheria-tetanus toxoids [facility]) 2-2 LF/0.5ML injection 0.5 mL (0.5 mLs IntraMUSCular Given 6/12/22 2211)   tetracaine (TETRAVISC) 0.5 % ophthalmic solution 1 drop (1 drop Left Eye Given 6/12/22 2211)   fluorescein ophthalmic strip 1 mg (1 mg Left Eye Given 6/12/22 2211)   ciprofloxacin (CILOXAN) 0.3 % ophthalmic solution 2 drop (2 drops Left Eye Given 6/12/22 2322)       Medical Decision Making: This is a 28 y.o. male presenting to the ED for left eye foreign body sensation. On initial presentation, the patient's vital signs are interpreted as hypertensive, afebrile and saturating well on room air. Based on history and physical exam, we have a broad differential.  Patient has evidence of metal foreign body with visible rust ring to the naked eye. We are concerned for corneal abrasion because of this. Tetanus will be updated. Patient is offered Tylenol for pain control but declines. Tetracaine and fluorescein evaluation will be performed. Slit-lamp will be performed. Using a cotton swab I was able to remove a large portion of metal from the patient's eye. Subsequently, using a TB syringe needle I was able to remove further metal using the slit-lamp.   Patient has residual rust ring and will need bur removal.    Bur performed and patient tolerated procedure well. Large amount of the rust ring removed. Patient will require antibiotics for corneal abrasion and foreign body that was contaminated. He is given a dose of ciprofloxacin ophthalmic care as this is what we have available. Provided with prescription for this and should use it 4 times a day for 5 days, 2 drops at a time. Visual acuity unchanged and patient has some symptomatic relief. We have stressed the importance of appropriate follow-up with ophthalmology within 1 day and primary care physician. Given strict return precautions. Verbalized understanding and agreeable. This patient's ED course included: a personal history and physicial examination and re-evaluation prior to disposition    This patient has improved during their ED course. Consultations:  None    Oxygen Saturation Interpretation: 100 % on room air. Normal    Counseling:   I have spoken with the patient and spouse/SO and discussed todays results, in addition to providing specific details for the plan of care and counseling regarding the diagnosis and prognosis. Questions are answered at this time and they are agreeable with the plan.     --------------------------------- IMPRESSION AND DISPOSITION ---------------------------------    IMPRESSION  1. Foreign body of left external eye, initial encounter    2. Corneal rust ring of left eye    3. Elevated blood pressure reading        DISPOSITION  Disposition: Discharge to home  Patient condition is fair    Dr. Nahum SALAZAR, am the primary provider of record    NOTE: This report was transcribed using voice recognition software.  Every effort was made to ensure accuracy; however, inadvertent computerized transcription errors may be present        Nahum Cai MD  06/13/22 0022

## 2022-06-23 ENCOUNTER — CLINICAL DOCUMENTATION (OUTPATIENT)
Dept: ORTHOPEDIC SURGERY | Age: 32
End: 2022-06-23

## 2022-06-23 NOTE — PROGRESS NOTES
Department of Orthopedic Surgery  History and Physical      CHIEF COMPLAINT: Right hand numbness and tingling    HISTORY OF PRESENT ILLNESS:                The patient is a left-hand-dominant 28 y.o. male who presents with right hand numbness and tingling. Patient has had symptoms for approximately 2 years. He initially wanted to schedule surgical intervention for his carpal tunnel but canceled secondary to concerns with COVID. Patient returns today with continued complaints of diminished sensation to the right thumb, index, and middle fingers. He continues to experience night paresthesias and numbness. Night bracing has failed to relieve his symptoms. He works as a  and notes that his symptoms have been affecting his ADLs his ability to perform his job. Denies any recent trauma. Patient did have an EMG nerve conduction study test dated March 11 of 2020. Right median nerve motor latency was 6.25 and left was 4.58. Right median nerve sensory latency was non-recordable and left was 4.32. Right ulnar nerve velocities 87 m/s above the elbow and 64 m/s below the elbow. On the EMG portion of the exam he did have 1+ dur and fwe PPP and decrease in recruitment pattern to the right APB. This is consistent with right moderate carpal tunnel syndrome and left mild carpal tunnel syndrome. Past Medical History:        Diagnosis Date    Hypertension      Past Surgical History:        Procedure Laterality Date    FRACTURE SURGERY      right wrist, left knee, right leg    KNEE SURGERY       Current Medications:   No current facility-administered medications for this visit. Allergies:  Patient has no known allergies. Social History:   TOBACCO:   reports that he has never smoked. He has never used smokeless tobacco.  ETOH:   reports no history of alcohol use. DRUGS:   reports current drug use. Drug: Marijuana Franko Rodríguez.   ACTIVITIES OF DAILY LIVING:    OCCUPATION:    Family History:       Problem Relation Age of Onset    No Known Problems Father     Heart Attack Maternal Grandfather     Heart Attack Maternal Uncle     Heart Attack Maternal Uncle     Heart Attack Maternal Uncle     Heart Attack Maternal Uncle        REVIEW OF SYSTEMS:  CONSTITUTIONAL:  negative  EYES:  negative  HEENT:  negative  RESPIRATORY:  negative  CARDIOVASCULAR:  htn  GASTROINTESTINAL:  negative  GENITOURINARY:  negative  INTEGUMENT/BREAST:  negative  HEMATOLOGIC/LYMPHATIC:  negative  ALLERGIC/IMMUNOLOGIC:  negative  ENDOCRINE:  negative  MUSCULOSKELETAL:  pain  NEUROLOGICAL:  N/T  BEHAVIOR/PSYCH:  negative    PHYSICAL EXAM:    VITALS:  There were no vitals taken for this visit. CONSTITUTIONAL:  awake, alert, cooperative, no apparent distress, and appears stated age  EYES:  Lids and lashes normal, pupils equal, round and reactive to light, extra ocular muscles intact, sclera clear, conjunctiva normal  ENT:  Normocephalic, without obvious abnormality, atraumatic, sinuses nontender on palpation, external ears without lesions, oral pharynx with moist mucus membranes, tonsils without erythema or exudates, gums normal and good dentition. NECK:  Supple, symmetrical, trachea midline, no adenopathy, thyroid symmetric, not enlarged and no tenderness, skin normal  LUNGS:  CTA  CARDIOVASCULAR:  2+ radial pulses, extremities warm and well perfused  ABDOMEN: Nondistended, NTTP  CHEST:  Atraumatic   GENITAL/URINARY:  deferred  NEUROLOGIC:  Awake, alert, oriented to name, place and time. Cranial nerves II-XII are grossly intact. Motor is 5 out of 5 bilaterally. Sensory is intact.  gait is normal.  MUSCULOSKELETAL:    Right upper extremity:  Non-tender about the shoulder and elbow with good ROM. + tinels of the cubital tunnel, + tinels of the carpal tunnel, + durkans, - finkelsteins, - CMC grind, - tenderness over the A1 pulleys with no active triggering. Full flexion and extension of the fingers.  - wartenbergs and cross finger testing, APB strength 4-/5 with no atrophy. Diminished sensation to the thumb, index, and middle fingers. ulnar, radial n intact to light touch. Brisk capillary refill. Gross motor 5/5. Left upper extremity:  Non-tender about the shoulder and elbow with good ROM. - tinels of the cubital tunnel, + tinels of the carpal tunnel, + durkans, - finkelsteins, - CMC grind, - tenderness over the A1 pulleys with no active triggering. Full flexion and extension of the fingers. - wartenbergs and cross finger testing, APB strength 5/5 with no atrophy. Diminished sensation to the thumb, index, and middle fingers. ulnar, radial n intact to light touch. Brisk capillary refill. Gross motor 5/5. DATA:    CBC:   Lab Results   Component Value Date    WBC 6.9 02/29/2020    RBC 5.50 02/29/2020    HGB 16.3 02/29/2020    HCT 48.6 02/29/2020    MCV 88.4 02/29/2020    MCH 29.6 02/29/2020    MCHC 33.5 02/29/2020    RDW 12.1 02/29/2020     02/29/2020    MPV 10.5 02/29/2020     PT/INR:  No results found for: PROTIME, INR    Radiology Review:   Xray: x-rays of the bilateral wrists were obtained today in the office and reviewed with the patient. 4 views: AP/oblique/lateral/carpal tunnel views: demonstrate no acute osseous abnormalities. No fractures or dislocations appreciated. Impression: No acute osseous abnormalities    IMPRESSION:  · Bilateral carpal tunnel syndrome right worse than left  · Right ulnar nerve neuritis at the elbow  · Hypertension    PLAN:  Discussed findings with patient. Discussed conservative and surgical management with the patient. Patient would like to proceed with surgical management. We will plan for a right endoscopic carpal tunnel release. Postoperative course explained to the patient. Patient like to proceed. All questions answered.  The risks, benefits, alternatives and complications of surgery were discussed with the patient including but not limited to the risks of infection, possible damage to nerves, vessels, or tendons, stiffness, loss of range of motion, scar sensitivity, wound healing complications, worsening symptoms, possible need for therapy, as well as the possible need further surgery and unanticipated complications. The patient voiced understanding and all questions were answered. The patient elected to proceed with surgical intervention. All questions were sought and answered today. I explained the risks, benefits, alternatives and complications of surgery with the patient including but not limited to the risks of infection, possible damage to nerves, vessels, or tendons, stiffness, loss of range of motion, scar sensitivity, wound healing complications, worsening symptoms, possible need for therapy, as well as the possible need further surgery and unanticipated complications. The patient voiced understanding and all questions were answered. The patient elected to proceed with surgical intervention. I have seen and evaluated the patient and agree with the above assessment and plan on today's visit. I have performed the key components of the history and physical examination with significant findings of right worse than left carpal tunnel syndrome. Does have some ulnar neuritis on the right. However his EMG nerve conduction study from 2 years ago demonstrated normal velocities. We will continue to observe the right ulnar neuritis. We will plan for right endoscopic carpal tunnel release. . I concur with the findings and plan as documented. History and Physical Update     Patient was seen and examined. Patient's history and physical was reviewed with the patient. There has been no significant interval changes.       Electronically signed by TAYLOR Hayden on 6/23/2022 at 8:31 AM

## 2022-07-15 ENCOUNTER — TELEPHONE (OUTPATIENT)
Dept: ORTHOPEDIC SURGERY | Age: 32
End: 2022-07-15

## 2022-07-15 DIAGNOSIS — G56.03 BILATERAL CARPAL TUNNEL SYNDROME: Primary | ICD-10-CM

## 2022-07-15 RX ORDER — HYDROCODONE BITARTRATE AND ACETAMINOPHEN 5; 325 MG/1; MG/1
1 TABLET ORAL EVERY 4 HOURS PRN
Qty: 12 TABLET | Refills: 0 | Status: SHIPPED | OUTPATIENT
Start: 2022-07-15 | End: 2022-07-18

## 2022-07-22 ENCOUNTER — OFFICE VISIT (OUTPATIENT)
Dept: ORTHOPEDIC SURGERY | Age: 32
End: 2022-07-22

## 2022-07-22 VITALS — WEIGHT: 315 LBS | HEIGHT: 75 IN | BODY MASS INDEX: 39.17 KG/M2

## 2022-07-22 DIAGNOSIS — G56.03 BILATERAL CARPAL TUNNEL SYNDROME: Primary | ICD-10-CM

## 2022-07-22 PROCEDURE — 99024 POSTOP FOLLOW-UP VISIT: CPT | Performed by: PHYSICIAN ASSISTANT

## 2022-07-22 NOTE — PROGRESS NOTES
HPI: Patient presents today POD # 7 s/p right endoscopic carpal tunnel release. Overall the patient is doing well. Preoperative symptoms have improved. Physical Exam: incision c/d/i with sutures in place. No erythema or signs of infection. Near full flexion and extension of the fingers. + pillar pain. Brisk capillary refill. Otherwise NVI. Assessment: POD # 7 s/p right endoscopic carpal tunnel release    Plan:   Sutures removed today in the office. Scar management advised. Steri-Strips applied. Patient does understand that he does have a risk for wound dehiscence. Patient to contact the office if this occurs. Activity restrictions reviewed with the patient. HEP and ROM exercises demonstrated to the patient. Follow up in 1 month to discuss a left endoscopic carpal tunnel release. He is considering a left endoscopic carpal tunnel release at the end of the year. All questions answered.

## 2022-11-12 ENCOUNTER — HOSPITAL ENCOUNTER (EMERGENCY)
Age: 32
Discharge: HOME | End: 2022-11-12
Attending: EMERGENCY MEDICINE
Payer: COMMERCIAL

## 2022-11-12 VITALS
TEMPERATURE: 98 F | BODY MASS INDEX: 38.5 KG/M2 | HEIGHT: 74 IN | HEART RATE: 92 BPM | RESPIRATION RATE: 16 BRPM | DIASTOLIC BLOOD PRESSURE: 78 MMHG | WEIGHT: 300 LBS | SYSTOLIC BLOOD PRESSURE: 144 MMHG | OXYGEN SATURATION: 98 %

## 2022-11-12 DIAGNOSIS — S05.00XA CORNEAL ABRASION, UNSPECIFIED LATERALITY, INITIAL ENCOUNTER: Primary | ICD-10-CM

## 2022-11-12 PROCEDURE — 99283 EMERGENCY DEPT VISIT LOW MDM: CPT

## 2022-11-12 PROCEDURE — 6370000000 HC RX 637 (ALT 250 FOR IP): Performed by: EMERGENCY MEDICINE

## 2022-11-12 RX ORDER — TETRACAINE HYDROCHLORIDE 5 MG/ML
1 SOLUTION OPHTHALMIC ONCE
Status: COMPLETED | OUTPATIENT
Start: 2022-11-12 | End: 2022-11-12

## 2022-11-12 RX ORDER — ERYTHROMYCIN 5 MG/G
OINTMENT OPHTHALMIC NIGHTLY
Status: DISCONTINUED | OUTPATIENT
Start: 2022-11-12 | End: 2022-11-12

## 2022-11-12 RX ORDER — IBUPROFEN 800 MG/1
800 TABLET ORAL EVERY 8 HOURS PRN
Qty: 20 TABLET | Refills: 0 | Status: SHIPPED | OUTPATIENT
Start: 2022-11-12 | End: 2022-11-17

## 2022-11-12 RX ORDER — ERYTHROMYCIN 5 MG/G
OINTMENT OPHTHALMIC ONCE
Status: DISCONTINUED | OUTPATIENT
Start: 2022-11-12 | End: 2022-11-12 | Stop reason: HOSPADM

## 2022-11-12 RX ORDER — TETRACAINE HYDROCHLORIDE 5 MG/ML
SOLUTION OPHTHALMIC
Status: DISCONTINUED
Start: 2022-11-12 | End: 2022-11-12 | Stop reason: HOSPADM

## 2022-11-12 RX ORDER — IBUPROFEN 600 MG/1
600 TABLET ORAL ONCE
Status: COMPLETED | OUTPATIENT
Start: 2022-11-12 | End: 2022-11-12

## 2022-11-12 RX ORDER — TOBRAMYCIN 3 MG/ML
1 SOLUTION/ DROPS OPHTHALMIC EVERY 4 HOURS
Qty: 5 ML | Refills: 0 | Status: SHIPPED | OUTPATIENT
Start: 2022-11-12 | End: 2022-11-22

## 2022-11-12 RX ADMIN — IBUPROFEN 600 MG: 600 TABLET ORAL at 05:04

## 2022-11-12 RX ADMIN — FLUORESCEIN SODIUM 1 MG: 1 STRIP OPHTHALMIC at 01:53

## 2022-11-12 RX ADMIN — TETRACAINE HYDROCHLORIDE 1 DROP: 5 SOLUTION OPHTHALMIC at 01:53

## 2022-11-12 ASSESSMENT — PAIN - FUNCTIONAL ASSESSMENT
PAIN_FUNCTIONAL_ASSESSMENT: 0-10
PAIN_FUNCTIONAL_ASSESSMENT: ACTIVITIES ARE NOT PREVENTED

## 2022-11-12 ASSESSMENT — PAIN DESCRIPTION - DESCRIPTORS: DESCRIPTORS: ACHING

## 2022-11-12 ASSESSMENT — PAIN DESCRIPTION - ORIENTATION: ORIENTATION: LEFT

## 2022-11-12 ASSESSMENT — PAIN DESCRIPTION - FREQUENCY: FREQUENCY: CONTINUOUS

## 2022-11-12 ASSESSMENT — PAIN DESCRIPTION - LOCATION: LOCATION: EYE

## 2022-11-12 ASSESSMENT — PAIN DESCRIPTION - PAIN TYPE: TYPE: ACUTE PAIN

## 2022-11-12 ASSESSMENT — LIFESTYLE VARIABLES
HOW OFTEN DO YOU HAVE A DRINK CONTAINING ALCOHOL: NEVER
HOW MANY STANDARD DRINKS CONTAINING ALCOHOL DO YOU HAVE ON A TYPICAL DAY: PATIENT DOES NOT DRINK

## 2022-11-12 ASSESSMENT — PAIN SCALES - GENERAL
PAINLEVEL_OUTOF10: 5
PAINLEVEL_OUTOF10: 4

## 2022-11-12 ASSESSMENT — PAIN DESCRIPTION - ONSET: ONSET: ON-GOING

## 2022-11-12 NOTE — DISCHARGE INSTRUCTIONS
NOTE:  As discussed, apply the eye OINTMENT at night and prior to sleeping. Apply the prescribed eye DROPS every 4 hours while awake. Make sure to follow up with the OPHTHALMOLOGIST in 3 days for re-evaluation.

## 2022-11-12 NOTE — ED PROVIDER NOTES
HPI:  11/12/22, Time: 4:57 AM ANI Hinojosa is a 28 y.o. male presenting to the ED for foreign body irritant sensation of the left eye, beginning 1 day ago. The complaint has been persistent, moderate in severity, and worsened by nothing. Patient states that he works as a  and does occasionally have foreign body irritant exposures but he has not had any recent wood shaving or metal grinding device nor any other high-pressure irritants exposures. He has had increased tearing from the left eye but no bleeding, no mucus drainage nor matting of the eyelids. No other complaints are reported. Review of Systems:   A complete review of systems was performed and pertinent positives and negatives are stated within HPI, all other systems reviewed and are negative.    --------------------------------------------- PAST HISTORY ---------------------------------------------  Past Medical History:  has a past medical history of Hypertension. Past Surgical History:  has a past surgical history that includes knee surgery and fracture surgery. Social History:  reports that he has never smoked. He has never used smokeless tobacco. He reports current drug use. Drug: Marijuana Laveda Tiger). He reports that he does not drink alcohol. Family History: family history includes Heart Attack in his maternal grandfather, maternal uncle, maternal uncle, maternal uncle, and maternal uncle; No Known Problems in his father. The patients home medications have been reviewed. Allergies: Patient has no known allergies. -------------------------------------------------- RESULTS -------------------------------------------------  All laboratory and radiology results have been personally reviewed by myself   LABS:  No results found for this visit on 11/12/22.     RADIOLOGY:  Interpreted by Radiologist.  No orders to display       ------------------------- NURSING NOTES AND VITALS REVIEWED ---------------------------    The nursing notes within the ED encounter and vital signs as below have been reviewed. BP (!) 144/78   Pulse 92   Temp 98 °F (36.7 °C) (Oral)   Resp 16   Ht 6' 2\" (1.88 m)   Wt 300 lb (136.1 kg)   SpO2 98%   BMI 38.52 kg/m² Oxygen Saturation Interpretation: Normal      ---------------------------------------------------PHYSICAL EXAM--------------------------------------      Constitutional/General: Alert and oriented x3, well appearing, non toxic in mild distress   Head: Normocephalic and atraumatic  Eyes: PERRL, EOMI, positive scleral injection of the left eye no hyphema, no hypopyon, no chemosis. Fluorescein stain and Woods lamp ultraviolet light exam showed evidence of corneal abrasion but there was no evidence of any impacted foreign body. Copious irrigation performed at the bedside with normal saline. Mouth: Oropharynx clear, handling secretions, no trismus  Neck: Supple, full ROM,   Pulmonary: Lungs clear to auscultation bilaterally, no wheezes, rales, or rhonchi. Not in respiratory distress  Cardiovascular:  Regular rate and rhythm, no murmurs, gallops, or rubs. 2+ distal pulses  Abdomen: Soft, non tender, non distended, otherwise normal  Extremities: Moves all extremities x 4. Warm and well perfused  Skin: warm and dry without rash  Neurologic: GCS 15, cranial nerves grossly intact no focal neurologic deficits. Psych: Normal Affect      ------------------------------ ED COURSE/MEDICAL DECISION MAKING----------------------  Medications   tetracaine (TETRAVISC) 0.5 % ophthalmic solution 1 drop (1 drop Left Eye Given 11/12/22 0153)   fluorescein ophthalmic strip 1 mg (1 mg Left Eye Given 11/12/22 1108)   ibuprofen (ADVIL;MOTRIN) tablet 600 mg (600 mg Oral Given 11/12/22 4356)         ED COURSE:     Medical Decision Making:   Patient did have eye inspection with eyelid eversion there was no evidence of any foreign body evident.   The patient is felt to have a corneal abrasion as explanation for his irritant sensation. He is given antibiotic ophthalmic ointment and also antibiotic ophthalmic drops for use for the next 5 to 7 days. He is mandated to follow-up with ophthalmology for follow-up definitive evaluation. Tetanus immunization booster was administered. Counseling: The emergency provider has spoken with the patient and discussed todays results, in addition to providing specific details for the plan of care and counseling regarding the diagnosis and prognosis. Questions are answered at this time and they are agreeable with the plan.      --------------------------------- IMPRESSION AND DISPOSITION ---------------------------------    IMPRESSION  1. Corneal abrasion, unspecified laterality, initial encounter        DISPOSITION  Disposition: Discharge to home  Patient condition is stable      NOTE: This report was transcribed using voice recognition software.  Every effort was made to ensure accuracy; however, inadvertent computerized transcription errors may be present        Kyaw Gutierrez MD  11/17/22 4155

## 2022-12-29 ENCOUNTER — APPOINTMENT (OUTPATIENT)
Dept: CT IMAGING | Age: 32
End: 2022-12-29
Payer: COMMERCIAL

## 2022-12-29 ENCOUNTER — APPOINTMENT (OUTPATIENT)
Dept: GENERAL RADIOLOGY | Age: 32
End: 2022-12-29
Payer: COMMERCIAL

## 2022-12-29 ENCOUNTER — HOSPITAL ENCOUNTER (EMERGENCY)
Age: 32
Discharge: HOME | End: 2022-12-29
Attending: EMERGENCY MEDICINE
Payer: COMMERCIAL

## 2022-12-29 VITALS
DIASTOLIC BLOOD PRESSURE: 69 MMHG | HEART RATE: 72 BPM | SYSTOLIC BLOOD PRESSURE: 142 MMHG | OXYGEN SATURATION: 97 % | WEIGHT: 300 LBS | BODY MASS INDEX: 38.52 KG/M2 | TEMPERATURE: 98.5 F | RESPIRATION RATE: 16 BRPM

## 2022-12-29 DIAGNOSIS — S93.402A SPRAIN OF LEFT ANKLE, UNSPECIFIED LIGAMENT, INITIAL ENCOUNTER: Primary | ICD-10-CM

## 2022-12-29 PROCEDURE — 73610 X-RAY EXAM OF ANKLE: CPT

## 2022-12-29 PROCEDURE — 99284 EMERGENCY DEPT VISIT MOD MDM: CPT

## 2022-12-29 PROCEDURE — 73700 CT LOWER EXTREMITY W/O DYE: CPT

## 2022-12-29 PROCEDURE — 73630 X-RAY EXAM OF FOOT: CPT

## 2022-12-29 RX ORDER — IBUPROFEN 800 MG/1
800 TABLET ORAL EVERY 8 HOURS PRN
Qty: 12 TABLET | Refills: 0 | Status: SHIPPED | OUTPATIENT
Start: 2022-12-29

## 2022-12-29 ASSESSMENT — PAIN - FUNCTIONAL ASSESSMENT
PAIN_FUNCTIONAL_ASSESSMENT: PREVENTS OR INTERFERES SOME ACTIVE ACTIVITIES AND ADLS
PAIN_FUNCTIONAL_ASSESSMENT: 0-10
PAIN_FUNCTIONAL_ASSESSMENT: PREVENTS OR INTERFERES SOME ACTIVE ACTIVITIES AND ADLS
PAIN_FUNCTIONAL_ASSESSMENT: NONE - DENIES PAIN

## 2022-12-29 ASSESSMENT — PAIN SCALES - GENERAL
PAINLEVEL_OUTOF10: 6
PAINLEVEL_OUTOF10: 8

## 2022-12-29 ASSESSMENT — PAIN DESCRIPTION - ORIENTATION
ORIENTATION: LEFT
ORIENTATION: LEFT

## 2022-12-29 ASSESSMENT — PAIN DESCRIPTION - LOCATION
LOCATION: ANKLE
LOCATION: ANKLE

## 2022-12-29 ASSESSMENT — PAIN DESCRIPTION - PAIN TYPE
TYPE: ACUTE PAIN
TYPE: ACUTE PAIN

## 2022-12-29 ASSESSMENT — PAIN DESCRIPTION - FREQUENCY
FREQUENCY: CONTINUOUS
FREQUENCY: CONTINUOUS

## 2022-12-29 ASSESSMENT — PAIN DESCRIPTION - ONSET
ONSET: SUDDEN
ONSET: ON-GOING

## 2022-12-29 ASSESSMENT — PAIN DESCRIPTION - DESCRIPTORS
DESCRIPTORS: SHARP;SHOOTING
DESCRIPTORS: SHARP;SHOOTING

## 2022-12-29 NOTE — Clinical Note
Kobe Nina was seen and treated in our emergency department on 12/29/2022. He may return to work on 12/31/2022. If you have any questions or concerns, please don't hesitate to call.       Bobby Velasco, DO

## 2022-12-29 NOTE — ED NOTES
Pt given received ace wrap and air cast at left ankle.   Pt given discharge summary with education on left ankle sprain, pt aware to follow up with his primary care physician and pt given work excuse     Victorino Hargrove RN  12/29/22 3364

## 2022-12-29 NOTE — ED PROVIDER NOTES
left.   Musculoskeletal:  No edema, no deformities. 's test intact left Achilles. Patient has some tenderness to palpation and localized swelling of the left lateral malleolus. Has some tenderness to palpation of the left Achilles tendon. Mild limitation in range of motion of the left ankle. No tenderness to palpation of the left foot, toes. No toenail trauma. No tenderness to palpation of the left knee or proximal tib-fib area. Integument:  Well hydrated, no abrasions/lacerations. Adequate perfusion. Neurologic:  Alert & oriented x 3, CN 2-12 normal, no focal deficits noted. DTRs 2+ bilateral patellar. Psychiatric:  Speech and behavior appropriate       -------------------------------------------------- RESULTS -------------------------------------------------  I have personally reviewed all laboratory and imaging results for this patient. Results are listed below. LABS:  No results found for this visit on 12/29/22. RADIOLOGY:  Interpreted by Radiologist.  [facility]   Final Result   1. No acute osseous abnormality involving the left ankle. 2. Incidental unfused ossicle along posterior margin of the talus (os   trigonum). 3. Degenerative osteophytosis along superior articular margin of   talonavicular joint. XR ANKLE LEFT (MIN 3 VIEWS)   Final Result   No acute fracture or dislocation. XR FOOT LEFT (MIN 3 VIEWS)   Final Result   No acute fracture or dislocation.               ------------------------- NURSING NOTES AND VITALS REVIEWED ---------------------------  The nursing notes within the ED encounter and vital signs as below have been reviewed by myself. BP (!) 142/69   Pulse 72   Temp 98.5 °F (36.9 °C)   Resp 16   Wt 300 lb (136.1 kg)   SpO2 97%   BMI 38.52 kg/m²   Oxygen Saturation Interpretation: Normal      The patients available past medical records and past encounters were reviewed.         ------------------------------ ED COURSE/MEDICAL DECISION MAKING----------------------  Medications - No data to display        Procedures:   none      Medical Decision Making:    X-rays of the foot and ankle are negative for acute fracture. Still having a significant amount of pain particularly in the posterior of the ankle toward the Achilles tendon. Machado test intact. CAT scan of the ankle was done to evaluate for posterior occult fracture, CAT scan is negative. Patient is satisfied now with these results. He has opted for an Ace wrap, ankle brace and has his own crutches. He will follow-up with podiatry tomorrow. He is neurovascularly intact and ambulatory on crutches, his own pair of crutches, upon discharge. He does have a ride home. Work note provided for today and tomorrow. This patient's ED course included: re-evaluation prior to disposition and a personal history and physicial eaxmination    This patient has remained hemodynamically stable and improved during their ED course. Re-Evaluations:  Time: 1:39 PM   Re-evaluation. Patients symptoms are improving  Repeat physical examination is improved      Consultations:   none    Critical Care: none    MARTIN, Anil Rodriguez, , am the Primary Provider of Record    Counseling: The emergency provider has spoken with the patient and discussed todays results, in addition to providing specific details for the plan of care and counseling regarding the diagnosis and prognosis. Questions are answered at this time and they are agreeable with the plan.    --------------------------- IMPRESSION AND DISPOSITION ---------------------------------    IMPRESSION  1.  Sprain of left ankle, unspecified ligament, initial encounter        DISPOSITION  Disposition: Discharge to home  Patient condition is stable              Kamari Cason DO  12/29/22 0851

## 2023-01-01 ENCOUNTER — HOSPITAL ENCOUNTER (EMERGENCY)
Age: 33
Discharge: HOME | End: 2023-01-01
Payer: COMMERCIAL

## 2023-01-01 VITALS
SYSTOLIC BLOOD PRESSURE: 127 MMHG | HEART RATE: 84 BPM | TEMPERATURE: 99 F | BODY MASS INDEX: 37.3 KG/M2 | HEIGHT: 75 IN | RESPIRATION RATE: 18 BRPM | OXYGEN SATURATION: 96 % | WEIGHT: 300 LBS | DIASTOLIC BLOOD PRESSURE: 88 MMHG

## 2023-01-01 DIAGNOSIS — S61.212A LACERATION OF RIGHT MIDDLE FINGER WITHOUT FOREIGN BODY WITHOUT DAMAGE TO NAIL, INITIAL ENCOUNTER: Primary | ICD-10-CM

## 2023-01-01 PROCEDURE — 99282 EMERGENCY DEPT VISIT SF MDM: CPT

## 2023-01-01 PROCEDURE — 12002 RPR S/N/AX/GEN/TRNK2.6-7.5CM: CPT

## 2023-01-02 NOTE — ED NOTES
Per provider, dressing applied. Nonadherent dressing with gauze wrap. Educated patient on bandaging and s/s that the bandages are too tight. Pt tolerated well. Verbalized understanding of instructions.       Kendall Rossi RN  01/01/23 2035

## 2023-01-02 NOTE — DISCHARGE INSTRUCTIONS
Wound Care Instructions:  Keep the wound clean, dry and covered for the next 48 hours. Put on a clean dressing every 12 hours. Cover with a light dressing at night so the wound does not rub against the bed. Wound may be left uncovered after 48 hours. You may apply a thin application of bacitracin or any antibiotic ointment on the wound AFTER 48 hours of dry dressings. After 48 hours keep the wound covered if there is a chance it could get wet or dirty. Remember- a wet wound will not heal.  Showering is fine, but do not soak the wounded area in a tub or dish-water until the wound is healed.

## 2023-01-02 NOTE — ED PROVIDER NOTES
Independent RAS Visit. [facility]  Department of Emergency Medicine   ED  Encounter Note  Admit Date/RoomTime: 2023  7:10 PM  ED Room:   NAME: Olvin Hester  : 1990  MRN: [id number]     Chief Complaint:  Laceration (Pt cut 3rd finger of right hand while making dinner tonight)    HISTORY OF PRESENT ILLNESS        Olvin Hester is a 33 y.o. male who presents to the ED with a laceration to his right middle finger. Patient states he was using a  to actually open up a brand-new knife. States once the plastic cut open the brand-new knife actually fell out of the package and then caught him to the right middle finger. Patient is left-hand dominant. Patient states his tetanus shot is up-to-date. Patient has no other complaints. ROS   Pertinent positives and negatives are stated within HPI, all other systems reviewed and are negative. Past Medical History:  has a past medical history of Arthritis and Hypertension. Surgical History:  has a past surgical history that includes knee surgery and fracture surgery. Social History:  reports that he has never smoked. He has never used smokeless tobacco. He reports current drug use. Drug: Marijuana Verenice Yimi). He reports that he does not drink alcohol. Family History: family history includes Heart Attack in his maternal grandfather, maternal uncle, maternal uncle, maternal uncle, and maternal uncle; No Known Problems in his father. Allergies: Patient has no known allergies. PHYSICAL EXAM   Oxygen Saturation Interpretation: Normal on room air analysis. ED Triage Vitals   BP Temp Temp src Heart Rate Resp SpO2 Height Weight   23 1854 23 1854 -- 23 1854 23 1854 23 1854 23 19123   (!) 151/81 99 °F (37.2 °C)  (!) 101 18 99 % 6' 3\" (1.905 m) 300 lb (136.1 kg)         General:  NAD. Alert and Oriented. Well-appearing. Skin:  Warm, dry.   No rashes. Head:  Normocephalic. Atraumatic. Eyes:  EOMI. Conjunctiva normal.  ENT:  Oral mucosa moist.  Airway patent. Neck:  Supple. Normal ROM. Respiratory:  No respiratory distress. No labored breathing. Lungs clear without rales, rhonchi or wheezing. Cardiovascular:  Regular rate. No Murmur. No peripheral edema. Extremities warm and good color. Extremities:    Right middle finger laceration starts from the PIP joint and travels along the lateral side to the DIP joint. You can see where it went in deeply and then as the knife slid it became superficial.  No visible tendon injury. He can flex and extend all fingers of the right hand. He can open and close all fingers of the right hand. 2+ right radial pulse. Back:  Normal ROM. Nontender to palpation. Neuro:  Alert and Oriented to person, place, time and situation. Normal LOC. Moves all extremities. Speech fluent. Psych:  Calm and Cooperative. Normal thought process. Normal judgement. Lab / Imaging Results   (All laboratory and radiology results have been personally reviewed by myself)  Labs:  No results found for this visit on 01/01/23. Imaging: All Radiology results interpreted by Radiologist unless otherwise noted. No orders to display       ED Course / Medical Decision Making   Medications - No data to display     Re-examination:  1/1/23       Time:   Patients condition . Consult(s):   None    Procedure(s):   PROCEDURE NOTE FOR LACERATION REPAIR    PERFORMED BY LOREN SAMUEL PA-C      TIME:  1945  LOCATION: Right middle finger  LENGTH: 5 cm  DEPTH:  Partial thickness   LOCAL ANESTHESIA: 2    ML of Lidocaine 1% without Epinephrine. PREP:  Cleansed with normal saline and wound cleanser and draped in a sterile fashion. Wound explored and found to have NO foreign body and NO tendon injury. CLOSURE:  Wound closed using  4.0     Ethilon; #  9  with Simple Interrupted sutures. Dressing applied.   Pt tolerated procedure okay.  He actually became lightheaded and pale. Felt like he was going to pass out. I did lay him supine. Wound care instructions provided by LILI and discussed with patient at bedside. MDM:   42-year-old male with laceration to the right middle finger. He had this was a pretty good laceration. No tendon involvement. No issues with range of motion. Required 9 sutures. During the procedure patient became lightheaded and pale. He was laid supine. Revitalized. Feels much better prior to discharge. Wound care instructions discussed with patient. Plan of Care/Counseling:  Rabia Cohen reviewed today's visit with the patient in addition to providing specific details for the plan of care and counseling regarding the diagnosis and prognosis. Questions are answered at this time and are agreeable with the plan. ASSESSMENT     1. Laceration of right middle finger without foreign body without damage to nail, initial encounter      PLAN   Discharged home. Patient condition is good    New Medications     New Prescriptions    No medications on file     Electronically signed by TAYLOR Cohen   DD: 1/1/23  **This report was transcribed using voice recognition software. Every effort was made to ensure accuracy; however, inadvertent computerized transcription errors may be present.   END OF ED PROVIDER NOTE       Rabia Cohen  01/01/23 2025

## 2024-04-18 ENCOUNTER — HOSPITAL ENCOUNTER (EMERGENCY)
Age: 34
Discharge: HOME | End: 2024-04-18
Attending: STUDENT IN AN ORGANIZED HEALTH CARE EDUCATION/TRAINING PROGRAM
Payer: COMMERCIAL

## 2024-04-18 VITALS
SYSTOLIC BLOOD PRESSURE: 150 MMHG | TEMPERATURE: 97.9 F | HEART RATE: 75 BPM | DIASTOLIC BLOOD PRESSURE: 87 MMHG | RESPIRATION RATE: 18 BRPM | OXYGEN SATURATION: 97 %

## 2024-04-18 DIAGNOSIS — H10.9 CONJUNCTIVITIS OF BOTH EYES, UNSPECIFIED CONJUNCTIVITIS TYPE: Primary | ICD-10-CM

## 2024-04-18 PROCEDURE — 6370000000 HC RX 637 (ALT 250 FOR IP): Performed by: STUDENT IN AN ORGANIZED HEALTH CARE EDUCATION/TRAINING PROGRAM

## 2024-04-18 PROCEDURE — 99283 EMERGENCY DEPT VISIT LOW MDM: CPT

## 2024-04-18 RX ORDER — ERYTHROMYCIN 5 MG/G
OINTMENT OPHTHALMIC ONCE
Status: COMPLETED | OUTPATIENT
Start: 2024-04-18 | End: 2024-04-18

## 2024-04-18 RX ORDER — OLOPATADINE HYDROCHLORIDE 2 MG/ML
1 SOLUTION/ DROPS OPHTHALMIC DAILY
Qty: 2.5 ML | Refills: 0 | Status: SHIPPED | OUTPATIENT
Start: 2024-04-18

## 2024-04-18 RX ADMIN — ERYTHROMYCIN: 5 OINTMENT OPHTHALMIC at 00:28

## 2024-04-18 ASSESSMENT — LIFESTYLE VARIABLES
HOW MANY STANDARD DRINKS CONTAINING ALCOHOL DO YOU HAVE ON A TYPICAL DAY: PATIENT DOES NOT DRINK
HOW OFTEN DO YOU HAVE A DRINK CONTAINING ALCOHOL: NEVER

## 2024-04-18 ASSESSMENT — ENCOUNTER SYMPTOMS
NAUSEA: 0
EYE ITCHING: 1
EYE PAIN: 0
SINUS PRESSURE: 0
EYE REDNESS: 1
SORE THROAT: 0
WHEEZING: 0
COUGH: 0
DIARRHEA: 0
EYE DISCHARGE: 1
SHORTNESS OF BREATH: 0
ABDOMINAL PAIN: 0
VOMITING: 0
BACK PAIN: 0

## 2024-04-18 ASSESSMENT — VISUAL ACUITY
OS: 20/20
OD: 20/30
OU: 20/20

## 2024-04-18 NOTE — ED PROVIDER NOTES
Cervical back: Normal range of motion.   Skin:     General: Skin is warm and dry.   Neurological:      General: No focal deficit present.      Mental Status: He is alert and oriented to person, place, and time.      Cranial Nerves: No cranial nerve deficit.          Procedures     MDM  Patient for evaluation of bilateral eye redness, drainage.  He is having associated itching.  Symptoms started this evening.  Possibly conjunctivitis bacterial versus viral versus allergic.  Negative fluorescein stain.  No evidence for orbital or periorbital cellulitis.  No vision change.  Will treat symptomatically.  He was given erythromycin ointment here and I also prescribed him Pataday drops and recommended over-the-counter Zyrtec.  He is nontoxic well-appearing stable for discharge home.  Return instructions provided    Medications   erythromycin (ROMYCIN) ophthalmic ointment ( Both Eyes Given 4/18/24 0028)            --------------------------------------------- PAST HISTORY ---------------------------------------------  Past Medical History:  has a past medical history of Arthritis and Hypertension.    Past Surgical History:  has a past surgical history that includes knee surgery and fracture surgery.    Social History:  reports that he has never smoked. He has never used smokeless tobacco. He reports current drug use. Drug: Marijuana (Weed). He reports that he does not drink alcohol.    Family History: family history includes Heart Attack in his maternal grandfather, maternal uncle, maternal uncle, maternal uncle, and maternal uncle; No Known Problems in his father.     The patient’s home medications have been reviewed.    Allergies: Patient has no known allergies.    -------------------------------------------------- RESULTS -------------------------------------------------  Labs:  No results found for this visit on 04/18/24.    Radiology:  No orders to display       ------------------------- NURSING NOTES AND VITALS

## 2024-04-18 NOTE — DISCHARGE INSTRUCTIONS
Erythromycin-Place half-inch ribbon into each lower eyelid 3 times daily for the next 7 days    You may try Zyrtec tab once daily as well for itch

## 2025-05-02 NOTE — TELEPHONE ENCOUNTER
Mom called requesting next steps for treatment for Mindy.    Pt called he would like a referral or Ortho Surgeon for his R knee